# Patient Record
Sex: MALE | Race: WHITE | NOT HISPANIC OR LATINO | Employment: OTHER | URBAN - METROPOLITAN AREA
[De-identification: names, ages, dates, MRNs, and addresses within clinical notes are randomized per-mention and may not be internally consistent; named-entity substitution may affect disease eponyms.]

---

## 2017-04-21 ENCOUNTER — TELEPHONE (OUTPATIENT)
Dept: HEMATOLOGY/ONCOLOGY | Facility: CLINIC | Age: 32
End: 2017-04-21

## 2017-04-21 NOTE — TELEPHONE ENCOUNTER
----- Message from Luisa Casas sent at 4/21/2017  1:25 PM CDT -----  Contact: Annie Spicer (girlfriend)  Returned call in regards to possibly scheduling apt for her boyfriend. She states that her boyfriend is incarcerated and is not sure how he would be able to come here. She states that her boyfriend is having a lot of different complications and is needing us to contact the retirement or someone in regards to getting him here to be seen. She states that he has been very weak, spitting up blood, he has passed out and hit his head as well. She asked that Dr. Gregorio call her to see what she should do    Contact number  [phone]    ----- Message -----     From: Deidre Douglas RN     Sent: 4/21/2017   7:59 AM       To: Vannessa Goodrich        ----- Message -----     From: Kandy Haskins     Sent: 4/17/2017   1:05 PM       To: Jg Sultana    Annie states the pt is currently incarcerated and believes his cancer is reoccurring he hasn't been feeling well, she would like to schedule an appt on his behalf and the retirement will release him to make this appt    Contact number  [phone]  Thanks

## 2017-04-21 NOTE — TELEPHONE ENCOUNTER
Returned call to Annie Spicer. Instructed her to get in touch with the authorities at the correction to request that he be seen by a doctor and to give them our contact information in case they need medical records.

## 2017-07-31 ENCOUNTER — TELEPHONE (OUTPATIENT)
Dept: HEMATOLOGY/ONCOLOGY | Facility: CLINIC | Age: 32
End: 2017-07-31

## 2017-07-31 ENCOUNTER — NURSE TRIAGE (OUTPATIENT)
Dept: ADMINISTRATIVE | Facility: CLINIC | Age: 32
End: 2017-07-31

## 2017-07-31 NOTE — TELEPHONE ENCOUNTER
Returned phone call to patient's Annie landry. She states she's already made an appointment to see Dr. Gregorio-- appointment is 08/18/2017.

## 2017-07-31 NOTE — TELEPHONE ENCOUNTER
Caregiver calling to schedule appointment with MD due to pt coughing up blood.  Caregiver not with Pt.  Transferred call to MD office for further evaluation.

## 2017-07-31 NOTE — TELEPHONE ENCOUNTER
----- Message from Kathie James sent at 7/31/2017  2:43 PM CDT -----  Contact: Pt's alexandria Valencia  Good afternoon,     Pt's gris is requesting an appt due to coughing up blood, passing out, feeling weak, fatigued. (I got a nurse on call for the pt to speak to).    Annie can be reached at [phone].    Thank you!

## 2017-08-18 ENCOUNTER — LAB VISIT (OUTPATIENT)
Dept: LAB | Facility: HOSPITAL | Age: 32
End: 2017-08-18
Payer: MEDICARE

## 2017-08-18 ENCOUNTER — OFFICE VISIT (OUTPATIENT)
Dept: HEMATOLOGY/ONCOLOGY | Facility: CLINIC | Age: 32
End: 2017-08-18
Payer: MEDICARE

## 2017-08-18 VITALS
SYSTOLIC BLOOD PRESSURE: 121 MMHG | WEIGHT: 158.75 LBS | DIASTOLIC BLOOD PRESSURE: 75 MMHG | TEMPERATURE: 98 F | BODY MASS INDEX: 24.92 KG/M2 | HEART RATE: 75 BPM | HEIGHT: 67 IN

## 2017-08-18 DIAGNOSIS — C85.80 LARGE CELL LYMPHOMA: Primary | ICD-10-CM

## 2017-08-18 DIAGNOSIS — C85.80 LARGE CELL LYMPHOMA: ICD-10-CM

## 2017-08-18 LAB
ALBUMIN SERPL BCP-MCNC: 3.7 G/DL
ALP SERPL-CCNC: 93 U/L
ALT SERPL W/O P-5'-P-CCNC: 44 U/L
ANION GAP SERPL CALC-SCNC: 6 MMOL/L
AST SERPL-CCNC: 25 U/L
BASOPHILS # BLD AUTO: 0.01 K/UL
BASOPHILS NFR BLD: 0.1 %
BILIRUB SERPL-MCNC: 0.4 MG/DL
BUN SERPL-MCNC: 15 MG/DL
CALCIUM SERPL-MCNC: 9 MG/DL
CHLORIDE SERPL-SCNC: 106 MMOL/L
CO2 SERPL-SCNC: 29 MMOL/L
CREAT SERPL-MCNC: 0.8 MG/DL
DIFFERENTIAL METHOD: ABNORMAL
EOSINOPHIL # BLD AUTO: 0.1 K/UL
EOSINOPHIL NFR BLD: 1.5 %
ERYTHROCYTE [DISTWIDTH] IN BLOOD BY AUTOMATED COUNT: 13.4 %
EST. GFR  (AFRICAN AMERICAN): >60 ML/MIN/1.73 M^2
EST. GFR  (NON AFRICAN AMERICAN): >60 ML/MIN/1.73 M^2
GLUCOSE SERPL-MCNC: 73 MG/DL
HCT VFR BLD AUTO: 39.2 %
HGB BLD-MCNC: 14 G/DL
LDH SERPL L TO P-CCNC: 154 U/L
LYMPHOCYTES # BLD AUTO: 1.6 K/UL
LYMPHOCYTES NFR BLD: 23.8 %
MCH RBC QN AUTO: 33 PG
MCHC RBC AUTO-ENTMCNC: 35.7 G/DL
MCV RBC AUTO: 93 FL
MONOCYTES # BLD AUTO: 0.7 K/UL
MONOCYTES NFR BLD: 10.3 %
NEUTROPHILS # BLD AUTO: 4.3 K/UL
NEUTROPHILS NFR BLD: 64 %
PLATELET # BLD AUTO: 165 K/UL
PMV BLD AUTO: 10.2 FL
POTASSIUM SERPL-SCNC: 4.3 MMOL/L
PROT SERPL-MCNC: 6.6 G/DL
RBC # BLD AUTO: 4.24 M/UL
SODIUM SERPL-SCNC: 141 MMOL/L
WBC # BLD AUTO: 6.71 K/UL

## 2017-08-18 PROCEDURE — 3008F BODY MASS INDEX DOCD: CPT | Mod: ,,, | Performed by: INTERNAL MEDICINE

## 2017-08-18 PROCEDURE — 36415 COLL VENOUS BLD VENIPUNCTURE: CPT

## 2017-08-18 PROCEDURE — 99213 OFFICE O/P EST LOW 20 MIN: CPT | Mod: S$PBB,,, | Performed by: INTERNAL MEDICINE

## 2017-08-18 PROCEDURE — 80053 COMPREHEN METABOLIC PANEL: CPT

## 2017-08-18 PROCEDURE — 85025 COMPLETE CBC W/AUTO DIFF WBC: CPT

## 2017-08-18 PROCEDURE — 99999 PR PBB SHADOW E&M-EST. PATIENT-LVL III: CPT | Mod: PBBFAC,,, | Performed by: INTERNAL MEDICINE

## 2017-08-18 PROCEDURE — 83615 LACTATE (LD) (LDH) ENZYME: CPT

## 2017-08-18 NOTE — PROGRESS NOTES
Subjective:       Patient ID: Tom Johnston is a 32 y.o. male.    Chief Complaint: No chief complaint on file.    HPI  KPS: 90% Mr. 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 Follow up CT scans of the chest, abdomen and pelvis done 5/2/14 prior to transplant showed no disease in the chest but some small residual lesions were seen in the liver (none greater than 7 mm) which had all shrunk from before. There were apparently small splenic lesions as well but no mention of size on that study. PET scan could not be obtained related to insurance prohibition. Gallium scan done 6/6/14 and 12/4/14 were negative. Hence, he appeared to be in a CR prior to transplant which continued at day 114. He has stopped chewing tobacco but continues to smoke pot occasionally and cigarrettes. He is off work now and drinks beer.     No pain, fever, weight loss or abdominal pain.    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 It was treated with surgery followed by adriamycin/cyclophosphamide at [facility] in [place]. In July 2012 he presented with vomiting and back pain. Its work-up included a CT scan that revealed multiple liver and spleen lesions as well as retrocrural, gelacio hepatis, gastrohepatic and peripancreatic lymphadenopathy. He was also found to have right axillary adenopathy which was biopsied on 7/12/12 demonstrating diffuse large B cell lymphoma. (Specimen# [id number]; [facility]) Mr. Johnston reports that there were a controversy of whether his diagnosis being Hodgkins' lymphoma vs. Non-Hodgkins before the final announcement of diffuse large B cell lymphoma. Nor the pathology sample is available for review nor cytogenetics or molecular studies. LDH was 1055 and CMV was found  positive at that time. HIV was negative.     Subsequently he was started on R-EPOCH without adriamycin under the direction of Dr. Ambriz finishing 8th cycle on 3/1/13. After this, he underwent a CT scan which demonstrated mareked improvement of all the lesions except for a splenic mass of 3.3cm. This was surveillanced with a repeat CT scan in 11/2013 demonstrating enlargement of splenic masses and interval development of 30 hypodense areas in the liver. He was given 1 cycle of ritixumab/cyclophosphamide/oncovin/doxil/prednisone on 11/11/13 and referred to [facility]. Bone marrow biopsy did not reveal any sign of lymphoma. Liver mass was attempted to be IR-guided FNA biopsied, but it was inconclusive. He was admitted to the inpatient service and started first cycle of salvage RICE on 12/16/13. He finished three more cycles of RICE 1/15/14, 2/27/14 and 4/7/14.     Review of Systems   Constitutional: Negative for activity change, appetite change, diaphoresis, fatigue, fever and unexpected weight change.   HENT: Negative for congestion and sore throat.         Sore throat over the weekend   Eyes: Positive for visual disturbance (blurriness).   Respiratory: Negative for cough.    Cardiovascular: Negative for chest pain.   Gastrointestinal: Negative for abdominal distention, nausea and vomiting.   Genitourinary: Negative for frequency and hematuria.   Musculoskeletal: Positive for back pain (Chronic back pain.).   Skin: Negative for rash.   Neurological: Negative for numbness.   Hematological: Negative for adenopathy.   Psychiatric/Behavioral: Negative for confusion.       Objective:      Physical Exam   Constitutional: He is oriented to person, place, and time. He appears well-developed and well-nourished. No distress.   HENT:   Head: Normocephalic and atraumatic.   Mouth/Throat: Oropharynx is clear and moist. No oropharyngeal exudate.   Eyes: Conjunctivae are normal. Pupils are equal, round, and reactive to light.   Neck:  Neck supple.   Cardiovascular: Normal rate and regular rhythm.    Pulmonary/Chest: Effort normal and breath sounds normal. He has no rales.   Abdominal: Soft. Bowel sounds are normal. He exhibits no mass. There is no splenomegaly. There is no tenderness.   Musculoskeletal: Normal range of motion. He exhibits no edema.   Lymphadenopathy:     He has no cervical adenopathy.     He has no axillary adenopathy.        Right: No inguinal adenopathy present.        Left: No inguinal adenopathy present.   Neurological: He is alert and oriented to person, place, and time.   Skin: Skin is warm and dry. No rash noted.   Psychiatric: He has a normal mood and affect. Thought content normal.       Assessment:       1. Large cell lymphoma        Plan:       Mr. Johnston is doing very well at 3 years post PBSCT. His blood counts, chemistries and LDH are all normal today. He continues to smoke and I discussed this with him in some detail today.  He is off work now but feeling well.  He continue on parole for previous run in with the law.    There is no indication of lymphoma by symptoms, exam or labs today and he remains very active with good oral intake with improved activity level. Though we are not sure his original lymphoma was gallium avid, he appeared to be in a CR based on his gallium scan 12/2014. Bone marrow done 6/11/14 showed 50% cellularity with trilineage hematopoiesis and no evidence of lymphoma. Cytogenetics 46,XY[20].  He missed many appointments for restaging over the past 2 years and given his normal blood counts I don't feel he needs another bone marrow at this time.    All of his questions were answered in the clinic today. I will see him back in 6 months.

## 2017-08-21 ENCOUNTER — TELEPHONE (OUTPATIENT)
Dept: HEMATOLOGY/ONCOLOGY | Facility: CLINIC | Age: 32
End: 2017-08-21

## 2017-08-21 NOTE — TELEPHONE ENCOUNTER
----- Message from Phillip Stoner sent at 8/21/2017 11:00 AM CDT -----  Contact: PT   PT is giving an update on phone number    Contact: [phone]

## 2018-04-19 DIAGNOSIS — Z51.11 ENCOUNTER FOR ANTINEOPLASTIC CHEMOTHERAPY: Primary | ICD-10-CM

## 2018-05-01 ENCOUNTER — TELEPHONE (OUTPATIENT)
Dept: HEMATOLOGY/ONCOLOGY | Facility: CLINIC | Age: 33
End: 2018-05-01

## 2018-05-01 NOTE — TELEPHONE ENCOUNTER
----- Message from Fabiana Daley sent at 5/1/2018 10:04 AM CDT -----  Contact: Pt  Pt called Patient Requesting Sooner Appointment.   Has a appt today and wants to come in early   Callback#[phone]

## 2018-05-02 ENCOUNTER — LAB VISIT (OUTPATIENT)
Dept: LAB | Facility: HOSPITAL | Age: 33
End: 2018-05-02
Payer: MEDICARE

## 2018-05-02 ENCOUNTER — OFFICE VISIT (OUTPATIENT)
Dept: HEMATOLOGY/ONCOLOGY | Facility: CLINIC | Age: 33
End: 2018-05-02
Payer: MEDICARE

## 2018-05-02 VITALS
TEMPERATURE: 98 F | HEART RATE: 84 BPM | WEIGHT: 158.75 LBS | DIASTOLIC BLOOD PRESSURE: 66 MMHG | RESPIRATION RATE: 18 BRPM | HEIGHT: 67 IN | SYSTOLIC BLOOD PRESSURE: 121 MMHG | BODY MASS INDEX: 24.92 KG/M2 | OXYGEN SATURATION: 95 %

## 2018-05-02 DIAGNOSIS — Z51.11 ENCOUNTER FOR ANTINEOPLASTIC CHEMOTHERAPY: ICD-10-CM

## 2018-05-02 DIAGNOSIS — C85.80 LARGE CELL LYMPHOMA: Primary | ICD-10-CM

## 2018-05-02 DIAGNOSIS — F32.A DEPRESSION, UNSPECIFIED DEPRESSION TYPE: ICD-10-CM

## 2018-05-02 DIAGNOSIS — Z94.84 HISTORY OF PERIPHERAL STEM CELL TRANSPLANT: ICD-10-CM

## 2018-05-02 LAB
ALBUMIN SERPL BCP-MCNC: 3.7 G/DL
ALP SERPL-CCNC: 86 U/L
ALT SERPL W/O P-5'-P-CCNC: 15 U/L
ANION GAP SERPL CALC-SCNC: 6 MMOL/L
AST SERPL-CCNC: 13 U/L
BASOPHILS # BLD AUTO: 0.03 K/UL
BASOPHILS NFR BLD: 0.4 %
BILIRUB SERPL-MCNC: 0.3 MG/DL
BUN SERPL-MCNC: 15 MG/DL
CALCIUM SERPL-MCNC: 9.4 MG/DL
CHLORIDE SERPL-SCNC: 109 MMOL/L
CO2 SERPL-SCNC: 27 MMOL/L
CREAT SERPL-MCNC: 0.8 MG/DL
DIFFERENTIAL METHOD: ABNORMAL
EOSINOPHIL # BLD AUTO: 0.1 K/UL
EOSINOPHIL NFR BLD: 1.1 %
ERYTHROCYTE [DISTWIDTH] IN BLOOD BY AUTOMATED COUNT: 13.2 %
EST. GFR  (AFRICAN AMERICAN): >60 ML/MIN/1.73 M^2
EST. GFR  (NON AFRICAN AMERICAN): >60 ML/MIN/1.73 M^2
GLUCOSE SERPL-MCNC: 88 MG/DL
HCT VFR BLD AUTO: 37.2 %
HGB BLD-MCNC: 13.1 G/DL
IMM GRANULOCYTES # BLD AUTO: 0.03 K/UL
IMM GRANULOCYTES NFR BLD AUTO: 0.4 %
LDH SERPL L TO P-CCNC: 132 U/L
LYMPHOCYTES # BLD AUTO: 1.8 K/UL
LYMPHOCYTES NFR BLD: 21.8 %
MCH RBC QN AUTO: 32.4 PG
MCHC RBC AUTO-ENTMCNC: 35.2 G/DL
MCV RBC AUTO: 92 FL
MONOCYTES # BLD AUTO: 0.7 K/UL
MONOCYTES NFR BLD: 8.3 %
NEUTROPHILS # BLD AUTO: 5.6 K/UL
NEUTROPHILS NFR BLD: 68 %
NRBC BLD-RTO: 0 /100 WBC
PLATELET # BLD AUTO: 184 K/UL
PMV BLD AUTO: 10.3 FL
POTASSIUM SERPL-SCNC: 4.2 MMOL/L
PROT SERPL-MCNC: 6.5 G/DL
RBC # BLD AUTO: 4.04 M/UL
SODIUM SERPL-SCNC: 142 MMOL/L
WBC # BLD AUTO: 8.18 K/UL

## 2018-05-02 PROCEDURE — 80053 COMPREHEN METABOLIC PANEL: CPT

## 2018-05-02 PROCEDURE — 85025 COMPLETE CBC W/AUTO DIFF WBC: CPT

## 2018-05-02 PROCEDURE — 83615 LACTATE (LD) (LDH) ENZYME: CPT

## 2018-05-02 PROCEDURE — 99999 PR PBB SHADOW E&M-EST. PATIENT-LVL III: CPT | Mod: PBBFAC,,, | Performed by: INTERNAL MEDICINE

## 2018-05-02 PROCEDURE — 99213 OFFICE O/P EST LOW 20 MIN: CPT | Mod: PBBFAC | Performed by: INTERNAL MEDICINE

## 2018-05-02 PROCEDURE — 36415 COLL VENOUS BLD VENIPUNCTURE: CPT

## 2018-05-02 PROCEDURE — 99215 OFFICE O/P EST HI 40 MIN: CPT | Mod: S$PBB,,, | Performed by: INTERNAL MEDICINE

## 2018-05-02 NOTE — PROGRESS NOTES
Hematology and Medical Oncology   Follow Up Note     05/02/2018    Primary Oncologic Diagnosis:DLBCL s/p ASCT in 2014    History of Present Ilness:   Tom Johnston (Tom) is a pleasant 33 y.o.male who presents after being lost to follow up for almost a year.    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 It was treated with surgery followed by adriamycin/cyclophosphamide at [facility] in [place].   --In July 2012 he presented with vomiting and back pain. Its work-up included a CT scan that revealed multiple liver and spleen lesions as well as retrocrural, gelacio hepatis, gastrohepatic and peripancreatic lymphadenopathy. He was also found to have right axillary adenopathy which was biopsied on 7/12/12 demonstrating diffuse large B cell lymphoma. (Specimen# [id number]; [facility])   --Mr. Johnston reports that there were a controversy of whether his diagnosis being Hodgkins' lymphoma vs. Non-Hodgkins before the final announcement of diffuse large B cell lymphoma. Nor the pathology sample is available for review nor cytogenetics or molecular studies. LDH was 1055 and CMV was found positive at that time. HIV was negative.    --Subsequently he was started on R-EPOCH without adriamycin under the direction of Dr. Ambriz finishing 8th cycle on 3/1/13. After this, he underwent a CT scan which demonstrated mareked improvement of all the lesions except for a splenic mass of 3.3cm. This was surveillanced with a repeat CT scan in 11/2013 demonstrating enlargement of splenic masses and interval development of 30 hypodense areas in the liver. He was given 1 cycle of ritixumab/cyclophosphamide/oncovin/doxil/prednisone on 11/11/13 and referred to [facility]. Bone marrow biopsy did not reveal any sign of lymphoma. Liver mass was attempted to be IR-guided FNA biopsied, but it was inconclusive. He was admitted to the inpatient service and started first cycle of salvage  RICE on 12/16/13. He finished three more cycles of RICE 1/15/14, 2/27/14 and 4/7/14.   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 There were apparently small splenic lesions as well but no mention of size on that study. PET scan could not be obtained related to insurance prohibition.   --Gallium scan done 6/6/14 and 12/4/14 were negative. Hence, he appeared to be in a CR prior to transplant which continued at day 114.   --Bone marrow done 6/11/14 showed 50% cellularity with trilineage hematopoiesis and no evidence of lymphoma. Cytogenetics 46,XY    Interval History:   Mr. Johnston is doing well overall. 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 He has noticed a slight increase in fatigue whenever he stops moving. He has tried to stop smoking by replacing it with chewing tobacco.     Past Medical History:   Past Medical History:   Diagnosis Date    Bone marrow transplant status     Cancer     Chronic back pain     Reflux        Current Medications:   Current Outpatient Prescriptions   Medication Sig    ranitidine (ZANTAC) 150 MG tablet Take 1 tablet (150 mg total) by mouth nightly.     No current facility-administered medications for this visit.      ALLERGIES:   Review of patient's allergies indicates:  No Known Allergies    Review of Systems:     Review of Systems   Constitutional: Positive for fatigue. Negative for appetite change, chills, diaphoresis, fever and unexpected weight change.   HENT:   Negative for hearing loss, mouth sores, nosebleeds, sore throat, trouble swallowing and voice change.    Eyes: Negative for eye problems and icterus.   Respiratory: Negative for chest tightness, cough, hemoptysis, shortness of breath and wheezing.     Cardiovascular: Negative for chest pain, leg swelling and palpitations.   Gastrointestinal: Negative for abdominal distention, abdominal pain, blood in stool, diarrhea, nausea and vomiting.   Endocrine: Negative for hot flashes.   Genitourinary: Negative for bladder incontinence, difficulty urinating, dysuria and hematuria.    Musculoskeletal: Negative for arthralgias, back pain, flank pain, gait problem, myalgias, neck pain and neck stiffness.   Skin: Negative for itching, rash and wound.   Neurological: Negative for dizziness, extremity weakness, gait problem, headaches, numbness, seizures and speech difficulty.   Hematological: Negative for adenopathy. Does not bruise/bleed easily.   Psychiatric/Behavioral: Negative for confusion, depression and sleep disturbance. The patient is not nervous/anxious.           Physical Exam:     Vitals:    05/02/18 0818   BP: 121/66   Pulse: 84   Resp: 18   Temp: 97.8 °F (36.6 °C)       Physical Exam   Constitutional: He is oriented to person, place, and time. He appears well-developed and well-nourished. No distress.   HENT:   Head: Normocephalic and atraumatic.   Mouth/Throat: Oropharynx is clear and moist. No oropharyngeal exudate.   Eyes: Conjunctivae and EOM are normal. Pupils are equal, round, and reactive to light.   Neck: Normal range of motion. Neck supple. No JVD present. No tracheal deviation present. No thyromegaly present.   Cardiovascular: Normal rate, regular rhythm and normal heart sounds.  Exam reveals no friction rub.    No murmur heard.  Pulmonary/Chest: Effort normal and breath sounds normal. No stridor. No respiratory distress. He has no wheezes. He has no rales. He exhibits no tenderness.   Abdominal: Soft. Bowel sounds are normal. He exhibits no distension. There is no tenderness. There is no rebound and no guarding.   Musculoskeletal: Normal range of motion. He exhibits no edema, tenderness or deformity.   Lymphadenopathy:     He has no axillary  adenopathy.   Neurological: He is alert and oriented to person, place, and time. He displays normal reflexes. No cranial nerve deficit or sensory deficit. He exhibits normal muscle tone. Coordination normal.   Skin: Skin is warm and dry. Capillary refill takes less than 2 seconds. No rash noted. He is not diaphoretic. No erythema. No pallor.   Numerous forarm tatoos   Psychiatric: He has a normal mood and affect. His behavior is normal. Judgment and thought content normal.       ECOG Performance Status: (foot note - ECOG PS provided by Eastern Cooperative Oncology Group) 0 - Asymptomatic    Karnofsky Performance Score:  100%- Normal, No Complaints, No Evidence of Disease    Labs:   Lab Results   Component Value Date    WBC 8.18 05/02/2018    HGB 13.1 (L) 05/02/2018    HCT 37.2 (L) 05/02/2018     05/02/2018    ALT 15 05/02/2018    AST 13 05/02/2018     05/02/2018    K 4.2 05/02/2018     05/02/2018    CREATININE 0.8 05/02/2018    BUN 15 05/02/2018    CO2 27 05/02/2018    INR 1.0 08/05/2014     LDH:132    Imaging: Previous imaging has been personally reviewed     Assessment and Plan:     Mr. Johnston is a 33 year old male who present post ASCT for lymphoma    DLBCL  --Doing well 4 years post ASCT  --All labs are within normal limits   --Denies all B symptoms  --Continue to follow with twice yearly physical exams    Tobacco Use  --Has stopped smoking cigarettes, but now utilizes chewing tobacco  --The importance of all tobacco cessation was discussed   --Medication and counseling were both offered, he is not willing to discuss either    He missed many appointments for restaging over the past 2 years and given his normal blood counts I don't feel he needs another bone marrow at this time.    30 minutes were spent face to face with the patient to discuss the disease, natural history, treatment options and survival statistics. I have provided the patient with an opportunity to ask questions and have all  questions answered to his satisfaction.       he will return to clinic in 6 months, but knows to call in the interim if symptoms change or should a problem arise.      Homa Gomez MD  Hematology and Medical Oncology  Bone Marrow Transplant  [facility]

## 2018-08-01 ENCOUNTER — TELEPHONE (OUTPATIENT)
Dept: HEMATOLOGY/ONCOLOGY | Facility: CLINIC | Age: 33
End: 2018-08-01

## 2018-08-01 NOTE — TELEPHONE ENCOUNTER
Called pt to remind him about his lab and 6 month follow up appt with Dr. Gomez. Unable to reach pt and unable to leave a voicemail. Will reschedule pt if pt is a no show this morning.

## 2018-10-10 ENCOUNTER — LAB VISIT (OUTPATIENT)
Dept: LAB | Facility: HOSPITAL | Age: 33
End: 2018-10-10
Payer: MEDICARE

## 2018-10-10 DIAGNOSIS — C83.30: ICD-10-CM

## 2018-10-10 DIAGNOSIS — C83.30: Primary | ICD-10-CM

## 2018-10-10 LAB
ALBUMIN SERPL BCP-MCNC: 4.2 G/DL
ALP SERPL-CCNC: 89 U/L
ALT SERPL W/O P-5'-P-CCNC: 16 U/L
ANION GAP SERPL CALC-SCNC: 6 MMOL/L
AST SERPL-CCNC: 17 U/L
BILIRUB SERPL-MCNC: 0.7 MG/DL
BUN SERPL-MCNC: 12 MG/DL
CALCIUM SERPL-MCNC: 9.6 MG/DL
CHLORIDE SERPL-SCNC: 104 MMOL/L
CO2 SERPL-SCNC: 29 MMOL/L
CREAT SERPL-MCNC: 0.8 MG/DL
ERYTHROCYTE [DISTWIDTH] IN BLOOD BY AUTOMATED COUNT: 12.7 %
EST. GFR  (AFRICAN AMERICAN): >60 ML/MIN/1.73 M^2
EST. GFR  (NON AFRICAN AMERICAN): >60 ML/MIN/1.73 M^2
GLUCOSE SERPL-MCNC: 92 MG/DL
HCT VFR BLD AUTO: 41 %
HGB BLD-MCNC: 14.3 G/DL
IMM GRANULOCYTES # BLD AUTO: 0.03 K/UL
LDH SERPL L TO P-CCNC: 154 U/L
MCH RBC QN AUTO: 32.8 PG
MCHC RBC AUTO-ENTMCNC: 34.9 G/DL
MCV RBC AUTO: 94 FL
NEUTROPHILS # BLD AUTO: 5.7 K/UL
PLATELET # BLD AUTO: 204 K/UL
PMV BLD AUTO: 10.1 FL
POTASSIUM SERPL-SCNC: 4.4 MMOL/L
PROT SERPL-MCNC: 6.8 G/DL
RBC # BLD AUTO: 4.36 M/UL
SODIUM SERPL-SCNC: 139 MMOL/L
WBC # BLD AUTO: 8.23 K/UL

## 2018-10-10 PROCEDURE — 80053 COMPREHEN METABOLIC PANEL: CPT

## 2018-10-10 PROCEDURE — 85027 COMPLETE CBC AUTOMATED: CPT

## 2018-10-10 PROCEDURE — 83615 LACTATE (LD) (LDH) ENZYME: CPT

## 2018-10-10 PROCEDURE — 36415 COLL VENOUS BLD VENIPUNCTURE: CPT

## 2018-10-11 ENCOUNTER — OFFICE VISIT (OUTPATIENT)
Dept: HEMATOLOGY/ONCOLOGY | Facility: CLINIC | Age: 33
End: 2018-10-11
Payer: MEDICARE

## 2018-10-11 VITALS
HEART RATE: 82 BPM | RESPIRATION RATE: 18 BRPM | SYSTOLIC BLOOD PRESSURE: 118 MMHG | TEMPERATURE: 98 F | DIASTOLIC BLOOD PRESSURE: 71 MMHG | WEIGHT: 158.31 LBS | OXYGEN SATURATION: 97 % | BODY MASS INDEX: 24.79 KG/M2

## 2018-10-11 DIAGNOSIS — N52.1 DIABETES MELLITUS WITH NEUROPATHY CAUSING ERECTILE DYSFUNCTION: ICD-10-CM

## 2018-10-11 DIAGNOSIS — Z94.84 HISTORY OF PERIPHERAL STEM CELL TRANSPLANT: Primary | ICD-10-CM

## 2018-10-11 DIAGNOSIS — E11.40 DIABETES MELLITUS WITH NEUROPATHY CAUSING ERECTILE DYSFUNCTION: ICD-10-CM

## 2018-10-11 DIAGNOSIS — C83.38 DIFFUSE LARGE B-CELL LYMPHOMA OF LYMPH NODES OF MULTIPLE REGIONS: ICD-10-CM

## 2018-10-11 PROCEDURE — 99215 OFFICE O/P EST HI 40 MIN: CPT | Mod: S$PBB,,, | Performed by: INTERNAL MEDICINE

## 2018-10-11 PROCEDURE — 99213 OFFICE O/P EST LOW 20 MIN: CPT | Mod: PBBFAC | Performed by: INTERNAL MEDICINE

## 2018-10-11 PROCEDURE — 99999 PR PBB SHADOW E&M-EST. PATIENT-LVL III: CPT | Mod: PBBFAC,,, | Performed by: INTERNAL MEDICINE

## 2018-10-11 NOTE — PROGRESS NOTES
Hematology and Medical Oncology   Follow Up Note     10/11/2018    Primary Oncologic Diagnosis:DLBCL s/p ASCT in 2014    History of Present Ilness:   Tom Johnston (Tom) is a pleasant 33 y.o.male who presents after being lost to follow up for almost a year.    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 It was treated with surgery followed by adriamycin/cyclophosphamide at [facility] in [place].   --In July 2012 he presented with vomiting and back pain. Its work-up included a CT scan that revealed multiple liver and spleen lesions as well as retrocrural, gelacio hepatis, gastrohepatic and peripancreatic lymphadenopathy. He was also found to have right axillary adenopathy which was biopsied on 7/12/12 demonstrating diffuse large B cell lymphoma. (Specimen# [id number]; [facility])   --Mr. Johnston reports that there were a controversy of whether his diagnosis being Hodgkins' lymphoma vs. Non-Hodgkins before the final announcement of diffuse large B cell lymphoma. Nor the pathology sample is available for review nor cytogenetics or molecular studies. LDH was 1055 and CMV was found positive at that time. HIV was negative.    --Subsequently he was started on R-EPOCH without adriamycin under the direction of Dr. Ambriz finishing 8th cycle on 3/1/13. After this, he underwent a CT scan which demonstrated mareked improvement of all the lesions except for a splenic mass of 3.3cm. This was surveillanced with a repeat CT scan in 11/2013 demonstrating enlargement of splenic masses and interval development of 30 hypodense areas in the liver. He was given 1 cycle of ritixumab/cyclophosphamide/oncovin/doxil/prednisone on 11/11/13 and referred to [facility]. Bone marrow biopsy did not reveal any sign of lymphoma. Liver mass was attempted to be IR-guided FNA biopsied, but it was inconclusive. He was admitted to the inpatient service and started first cycle of salvage  RICE on 12/16/13. He finished three more cycles of RICE 1/15/14, 2/27/14 and 4/7/14.   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 There were apparently small splenic lesions as well but no mention of size on that study. PET scan could not be obtained related to insurance prohibition.   --Gallium scan done 6/6/14 and 12/4/14 were negative. Hence, he appeared to be in a CR prior to transplant which continued at day 114.   --Bone marrow done 6/11/14 showed 50% cellularity with trilineage hematopoiesis and no evidence of lymphoma. Cytogenetics 46,XY    Interval History:   Mr. Johnston is doing well. He still works in a boiler room. Recently he has found himself having blurry vision, but never seen by a doctor. His other pressing issue is long standing lack of sexual desire and inconsistent erection. He continues to smoke 2-3 cigarettes/day and has not made an attempt to quit therapy.     Past Medical History:   Past Medical History:   Diagnosis Date    Bone marrow transplant status     Cancer     Chronic back pain     Reflux        Current Medications:   No current outpatient medications on file.     No current facility-administered medications for this visit.      ALLERGIES:   Review of patient's allergies indicates:  No Known Allergies    Review of Systems:     Review of Systems   Constitutional: Positive for fatigue. Negative for appetite change, chills, diaphoresis, fever and unexpected weight change.   HENT:   Negative for hearing loss, mouth sores, nosebleeds, sore throat, trouble swallowing and voice change.    Eyes: Negative for eye problems and icterus.   Respiratory: Negative for chest tightness, cough, hemoptysis, shortness of breath and wheezing.     Cardiovascular: Negative for chest pain, leg swelling and palpitations.   Gastrointestinal: Negative for abdominal distention, abdominal pain, blood in stool, diarrhea, nausea and vomiting.   Endocrine: Negative for hot flashes.   Genitourinary: Negative for bladder incontinence, difficulty urinating, dysuria and hematuria.    Musculoskeletal: Negative for arthralgias, back pain, flank pain, gait problem, myalgias, neck pain and neck stiffness.   Skin: Negative for itching, rash and wound.   Neurological: Negative for dizziness, extremity weakness, gait problem, headaches, numbness, seizures and speech difficulty.   Hematological: Negative for adenopathy. Does not bruise/bleed easily.   Psychiatric/Behavioral: Negative for confusion, depression and sleep disturbance. The patient is not nervous/anxious.         Physical Exam:     Vitals:    10/11/18 1511   BP: 118/71   Pulse: 82   Resp: 18   Temp: 98.1 °F (36.7 °C)       Physical Exam   Constitutional: He is oriented to person, place, and time. He appears well-developed and well-nourished. No distress.   HENT:   Head: Normocephalic and atraumatic.   Mouth/Throat: Oropharynx is clear and moist. No oropharyngeal exudate.   Eyes: Conjunctivae and EOM are normal. Pupils are equal, round, and reactive to light.   Neck: Normal range of motion. Neck supple. No JVD present. No tracheal deviation present. No thyromegaly present.   Cardiovascular: Normal rate, regular rhythm and normal heart sounds. Exam reveals no friction rub.   No murmur heard.  Pulmonary/Chest: Effort normal and breath sounds normal. No stridor. No respiratory distress. He has no wheezes. He has no rales. He exhibits no tenderness.   Abdominal: Soft. Bowel sounds are normal. He exhibits no distension. There is no tenderness. There is no rebound and no guarding.   Musculoskeletal: Normal range of motion. He exhibits no edema, tenderness or deformity.   Lymphadenopathy:     He has no axillary adenopathy.    Neurological: He is alert and oriented to person, place, and time. He displays normal reflexes. No cranial nerve deficit or sensory deficit. He exhibits normal muscle tone. Coordination normal.   Skin: Skin is warm and dry. Capillary refill takes less than 2 seconds. No rash noted. He is not diaphoretic. No erythema. No pallor.   Numerous forarm tatoos   Psychiatric: He has a normal mood and affect. His behavior is normal. Judgment and thought content normal.       ECOG Performance Status: (foot note - ECOG PS provided by Eastern Cooperative Oncology Group) 0 - Asymptomatic    Karnofsky Performance Score:  100%- Normal, No Complaints, No Evidence of Disease    Labs:   Lab Results   Component Value Date    WBC 8.23 10/10/2018    HGB 14.3 10/10/2018    HCT 41.0 10/10/2018     10/10/2018    ALT 16 10/10/2018    AST 17 10/10/2018     10/10/2018    K 4.4 10/10/2018     10/10/2018    CREATININE 0.8 10/10/2018    BUN 12 10/10/2018    CO2 29 10/10/2018    INR 1.0 08/05/2014     LDH:132    Imaging: Previous imaging has been personally reviewed     Assessment and Plan:     Mr. Johnston is a 33 year old male who present post ASCT for lymphoma    DLBCL  --Doing well 4 years post ASCT  --All labs are within normal limits   --Denies all B symptoms  --Continue to follow with twice yearly physical exams    Tobacco Use  --He has returned to smoking cigarettes  --The importance of all tobacco cessation was discussed   --Medication and counseling were both offered, he is not willing to discuss either    Erectile Dysfunction  --Possibly related to extensive previous chemotherapy  --Will refer to urology    He missed many appointments for restaging over the past 2 years and given his normal blood counts I don't feel he needs another bone marrow at this time.    30 minutes were spent face to face with the patient to discuss the disease, natural history, treatment options and survival statistics. I have provided the  patient with an opportunity to ask questions and have all questions answered to his satisfaction.       he will return to clinic in 6 months, but knows to call in the interim if symptoms change or should a problem arise.      Homa Gomez MD  Hematology and Medical Oncology  Bone Marrow Transplant  [facility]

## 2019-04-09 ENCOUNTER — TELEPHONE (OUTPATIENT)
Dept: HEMATOLOGY/ONCOLOGY | Facility: CLINIC | Age: 34
End: 2019-04-09

## 2019-04-09 NOTE — TELEPHONE ENCOUNTER
Returned call rescheduled for 4/29 lab and 4/30 with Dr Gomez.   Mailed new appt slip    ----- Message from Diana Valenzuela sent at 4/8/2019  3:42 PM CDT -----  Contact: Pt   Pt calling to reschedule his lab appt to later , around 2 pm  Please contact him at [phone]

## 2019-04-29 ENCOUNTER — LAB VISIT (OUTPATIENT)
Dept: LAB | Facility: HOSPITAL | Age: 34
End: 2019-04-29
Payer: MEDICARE

## 2019-04-29 DIAGNOSIS — C83.30: ICD-10-CM

## 2019-04-29 LAB
ALBUMIN SERPL BCP-MCNC: 4.1 G/DL (ref 3.5–5.2)
ALP SERPL-CCNC: 97 U/L (ref 55–135)
ALT SERPL W/O P-5'-P-CCNC: 16 U/L (ref 10–44)
ANION GAP SERPL CALC-SCNC: 9 MMOL/L (ref 8–16)
AST SERPL-CCNC: 18 U/L (ref 10–40)
BILIRUB SERPL-MCNC: 0.5 MG/DL (ref 0.1–1)
BUN SERPL-MCNC: 13 MG/DL (ref 6–20)
CALCIUM SERPL-MCNC: 9.8 MG/DL (ref 8.7–10.5)
CHLORIDE SERPL-SCNC: 106 MMOL/L (ref 95–110)
CO2 SERPL-SCNC: 27 MMOL/L (ref 23–29)
CREAT SERPL-MCNC: 0.8 MG/DL (ref 0.5–1.4)
ERYTHROCYTE [DISTWIDTH] IN BLOOD BY AUTOMATED COUNT: 12.7 % (ref 11.5–14.5)
EST. GFR  (AFRICAN AMERICAN): >60 ML/MIN/1.73 M^2
EST. GFR  (NON AFRICAN AMERICAN): >60 ML/MIN/1.73 M^2
GLUCOSE SERPL-MCNC: 95 MG/DL (ref 70–110)
HCT VFR BLD AUTO: 38.5 % (ref 40–54)
HGB BLD-MCNC: 13.6 G/DL (ref 14–18)
IMM GRANULOCYTES # BLD AUTO: 0.03 K/UL (ref 0–0.04)
LDH SERPL L TO P-CCNC: 150 U/L (ref 110–260)
MCH RBC QN AUTO: 32.8 PG (ref 27–31)
MCHC RBC AUTO-ENTMCNC: 35.3 G/DL (ref 32–36)
MCV RBC AUTO: 93 FL (ref 82–98)
NEUTROPHILS # BLD AUTO: 6.4 K/UL (ref 1.8–7.7)
PLATELET # BLD AUTO: 205 K/UL (ref 150–350)
PMV BLD AUTO: 10.6 FL (ref 9.2–12.9)
POTASSIUM SERPL-SCNC: 4.2 MMOL/L (ref 3.5–5.1)
PROT SERPL-MCNC: 6.8 G/DL (ref 6–8.4)
RBC # BLD AUTO: 4.15 M/UL (ref 4.6–6.2)
SODIUM SERPL-SCNC: 142 MMOL/L (ref 136–145)
WBC # BLD AUTO: 9.02 K/UL (ref 3.9–12.7)

## 2019-04-29 PROCEDURE — 83615 LACTATE (LD) (LDH) ENZYME: CPT

## 2019-04-29 PROCEDURE — 36415 COLL VENOUS BLD VENIPUNCTURE: CPT

## 2019-04-29 PROCEDURE — 80053 COMPREHEN METABOLIC PANEL: CPT

## 2019-04-29 PROCEDURE — 85027 COMPLETE CBC AUTOMATED: CPT

## 2019-04-30 ENCOUNTER — TELEPHONE (OUTPATIENT)
Dept: HEMATOLOGY/ONCOLOGY | Facility: CLINIC | Age: 34
End: 2019-04-30

## 2019-04-30 ENCOUNTER — OFFICE VISIT (OUTPATIENT)
Dept: HEMATOLOGY/ONCOLOGY | Facility: CLINIC | Age: 34
End: 2019-04-30
Payer: MEDICARE

## 2019-04-30 VITALS
SYSTOLIC BLOOD PRESSURE: 123 MMHG | HEART RATE: 79 BPM | WEIGHT: 168.88 LBS | DIASTOLIC BLOOD PRESSURE: 79 MMHG | HEIGHT: 66 IN | TEMPERATURE: 98 F | BODY MASS INDEX: 27.14 KG/M2

## 2019-04-30 DIAGNOSIS — C83.38 DIFFUSE LARGE B-CELL LYMPHOMA OF LYMPH NODES OF MULTIPLE REGIONS: ICD-10-CM

## 2019-04-30 DIAGNOSIS — Z00.00 HEALTHCARE MAINTENANCE: Primary | ICD-10-CM

## 2019-04-30 DIAGNOSIS — H53.8 BLURRY VISION: ICD-10-CM

## 2019-04-30 PROCEDURE — 99999 PR PBB SHADOW E&M-EST. PATIENT-LVL III: ICD-10-PCS | Mod: PBBFAC,,, | Performed by: INTERNAL MEDICINE

## 2019-04-30 PROCEDURE — 99213 OFFICE O/P EST LOW 20 MIN: CPT | Mod: PBBFAC | Performed by: INTERNAL MEDICINE

## 2019-04-30 PROCEDURE — 99215 PR OFFICE/OUTPT VISIT, EST, LEVL V, 40-54 MIN: ICD-10-PCS | Mod: S$PBB,,, | Performed by: INTERNAL MEDICINE

## 2019-04-30 PROCEDURE — 99215 OFFICE O/P EST HI 40 MIN: CPT | Mod: S$PBB,,, | Performed by: INTERNAL MEDICINE

## 2019-04-30 PROCEDURE — 99999 PR PBB SHADOW E&M-EST. PATIENT-LVL III: CPT | Mod: PBBFAC,,, | Performed by: INTERNAL MEDICINE

## 2019-04-30 NOTE — PROGRESS NOTES
Hematology and Medical Oncology   Follow Up Note     04/30/2019    Primary Oncologic Diagnosis:DLBCL s/p ASCT in 2014    History of Present Ilness:   Tom Johnston (Tom) is a pleasant 34 y.o.male who presents after being lost to follow up for almost a year.    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 It was treated with surgery followed by adriamycin/cyclophosphamide at [facility] in [place].   --In July 2012 he presented with vomiting and back pain. Its work-up included a CT scan that revealed multiple liver and spleen lesions as well as retrocrural, gelacio hepatis, gastrohepatic and peripancreatic lymphadenopathy. He was also found to have right axillary adenopathy which was biopsied on 7/12/12 demonstrating diffuse large B cell lymphoma. (Specimen# [id number]; [facility])   --Mr. Johnston reports that there were a controversy of whether his diagnosis being Hodgkins' lymphoma vs. Non-Hodgkins before the final announcement of diffuse large B cell lymphoma. Nor the pathology sample is available for review nor cytogenetics or molecular studies. LDH was 1055 and CMV was found positive at that time. HIV was negative.    --Subsequently he was started on R-EPOCH without adriamycin under the direction of Dr. Ambriz finishing 8th cycle on 3/1/13. After this, he underwent a CT scan which demonstrated mareked improvement of all the lesions except for a splenic mass of 3.3cm. This was surveillanced with a repeat CT scan in 11/2013 demonstrating enlargement of splenic masses and interval development of 30 hypodense areas in the liver. He was given 1 cycle of ritixumab/cyclophosphamide/oncovin/doxil/prednisone on 11/11/13 and referred to [facility]. Bone marrow biopsy did not reveal any sign of lymphoma. Liver mass was attempted to be IR-guided FNA biopsied, but it was inconclusive. He was admitted to the inpatient service and started first cycle of salvage  RICE on 12/16/13. He finished three more cycles of RICE 1/15/14, 2/27/14 and 4/7/14.   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 There were apparently small splenic lesions as well but no mention of size on that study. PET scan could not be obtained related to insurance prohibition.   --Gallium scan done 6/6/14 and 12/4/14 were negative. Hence, he appeared to be in a CR prior to transplant which continued at day 114.   --Bone marrow done 6/11/14 showed 50% cellularity with trilineage hematopoiesis and no evidence of lymphoma. Cytogenetics 46,XY    Interval History:   Mr. Johnston is doing well. He still works in a boiler room. The blurry vision discussed at the last visit is subjectively better, but he has never seen optho like I previously suggested. His only complaint is a circular crusty lesion on the axillary fold of the left arm. It has been slowly enlarging. It will occassionally dry out and crack, which gets stuck on his shirt causing it to bleed.    Past Medical History:   Past Medical History:   Diagnosis Date    Bone marrow transplant status     Cancer     Chronic back pain     Reflux        Current Medications:   No current outpatient medications on file.     No current facility-administered medications for this visit.      ALLERGIES:   Review of patient's allergies indicates:  No Known Allergies    Review of Systems:     Review of Systems   Constitutional: Positive for fatigue. Negative for appetite change, chills, diaphoresis, fever and unexpected weight change.   HENT:   Negative for hearing loss, mouth sores, nosebleeds, sore throat, trouble swallowing and voice change.    Eyes: Negative for eye problems and icterus.   Respiratory: Negative  for chest tightness, cough, hemoptysis, shortness of breath and wheezing.    Cardiovascular: Negative for chest pain, leg swelling and palpitations.   Gastrointestinal: Negative for abdominal distention, abdominal pain, blood in stool, diarrhea, nausea and vomiting.   Endocrine: Negative for hot flashes.   Genitourinary: Negative for bladder incontinence, difficulty urinating, dysuria and hematuria.    Musculoskeletal: Negative for arthralgias, back pain, flank pain, gait problem, myalgias, neck pain and neck stiffness.   Skin: Negative for itching, rash and wound.   Neurological: Negative for dizziness, extremity weakness, gait problem, headaches, numbness, seizures and speech difficulty.   Hematological: Negative for adenopathy. Does not bruise/bleed easily.   Psychiatric/Behavioral: Negative for confusion, depression and sleep disturbance. The patient is not nervous/anxious.         Physical Exam:     Vitals:    04/30/19 0924   BP: 123/79   Pulse: 79   Temp: 97.8 °F (36.6 °C)       Physical Exam   Constitutional: He is oriented to person, place, and time. He appears well-developed and well-nourished. No distress.   HENT:   Head: Normocephalic and atraumatic.   Mouth/Throat: Oropharynx is clear and moist. No oropharyngeal exudate.   Eyes: Pupils are equal, round, and reactive to light. Conjunctivae and EOM are normal.   Neck: Normal range of motion. Neck supple. No JVD present. No tracheal deviation present. No thyromegaly present.   Cardiovascular: Normal rate, regular rhythm and normal heart sounds. Exam reveals no friction rub.   No murmur heard.  Pulmonary/Chest: Effort normal and breath sounds normal. No stridor. No respiratory distress. He has no wheezes. He has no rales. He exhibits no tenderness.   Abdominal: Soft. Bowel sounds are normal. He exhibits no distension. There is no tenderness. There is no rebound and no guarding.   Musculoskeletal: Normal range of motion. He exhibits no edema, tenderness or  deformity.   Lymphadenopathy:     He has no axillary adenopathy.   Neurological: He is alert and oriented to person, place, and time. He displays normal reflexes. No cranial nerve deficit or sensory deficit. He exhibits normal muscle tone. Coordination normal.   Skin: Skin is warm and dry. Capillary refill takes less than 2 seconds. No rash noted. He is not diaphoretic. No erythema. No pallor.        Numerous forarm tatoos  erythematous scaly circular lesion   Psychiatric: He has a normal mood and affect. His behavior is normal. Judgment and thought content normal.       ECOG Performance Status: (foot note - ECOG PS provided by Eastern Cooperative Oncology Group) 0 - Asymptomatic    Karnofsky Performance Score:  100%- Normal, No Complaints, No Evidence of Disease    Labs:   Lab Results   Component Value Date    WBC 9.02 04/29/2019    HGB 13.6 (L) 04/29/2019    HCT 38.5 (L) 04/29/2019     04/29/2019    ALT 16 04/29/2019    AST 18 04/29/2019     04/29/2019    K 4.2 04/29/2019     04/29/2019    CREATININE 0.8 04/29/2019    BUN 13 04/29/2019    CO2 27 04/29/2019    INR 1.0 08/05/2014     LDH:132    Imaging: Previous imaging has been personally reviewed     Assessment and Plan:     Mr. Johnston is a 34 year old male who present post ASCT for lymphoma    DLBCL  --Doing well 5 years post ASCT  --All labs are within normal limits   --Denies all B symptoms  --Continue to follow with twice yearly physical exams    Tobacco Use  --He has returned to smoking cigarettes  --The importance of all tobacco cessation was discussed   --Medication and counseling were both offered, he is not willing to discuss either    Erectile Dysfunction  --Possibly related to extensive previous chemotherapy  --Will refer to urology    Erythematous lesion  --Possible fungal vs scaly lesion  --Will refer to pcp for health maintenance     He missed many appointments for restaging over the past 2 years and given his normal blood counts  I don't feel he needs another bone marrow at this time.    30 minutes were spent face to face with the patient to discuss the disease, natural history, treatment options and survival statistics. I have provided the patient with an opportunity to ask questions and have all questions answered to his satisfaction.       he will return to clinic in 6 months, but knows to call in the interim if symptoms change or should a problem arise.      Homa Gomez MD  Hematology and Medical Oncology  Bone Marrow Transplant  [facility]

## 2019-04-30 NOTE — TELEPHONE ENCOUNTER
Called patient to give him the number to the [facility] clinic to schedule a primary care physician appointment. There was no voice mail set up to leave a message.        ----- Message from Homa Gomez MD sent at 4/30/2019  1:17 PM CDT -----  1. Referral to PCP  2. See me in 6 months with cbc,cmp

## 2019-04-30 NOTE — TELEPHONE ENCOUNTER
Scheduled first available for 6/24 at [facility] internal medicine. Mailed appt slip.

## 2019-05-16 ENCOUNTER — HOSPITAL ENCOUNTER (EMERGENCY)
Facility: HOSPITAL | Age: 34
Discharge: HOME | End: 2019-05-16
Attending: EMERGENCY MEDICINE
Payer: MEDICARE

## 2019-05-16 VITALS
BODY MASS INDEX: 27.32 KG/M2 | DIASTOLIC BLOOD PRESSURE: 73 MMHG | WEIGHT: 170 LBS | SYSTOLIC BLOOD PRESSURE: 131 MMHG | OXYGEN SATURATION: 97 % | TEMPERATURE: 99 F | HEIGHT: 66 IN | RESPIRATION RATE: 18 BRPM | HEART RATE: 94 BPM

## 2019-05-16 DIAGNOSIS — S61.431A PUNCTURE WOUND OF RIGHT HAND WITHOUT FOREIGN BODY, INITIAL ENCOUNTER: Primary | ICD-10-CM

## 2019-05-16 PROCEDURE — 90715 TDAP VACCINE 7 YRS/> IM: CPT | Performed by: EMERGENCY MEDICINE

## 2019-05-16 PROCEDURE — 90471 IMMUNIZATION ADMIN: CPT | Performed by: EMERGENCY MEDICINE

## 2019-05-16 PROCEDURE — 99284 EMERGENCY DEPT VISIT MOD MDM: CPT | Mod: 25

## 2019-05-16 PROCEDURE — 63600175 PHARM REV CODE 636 W HCPCS: Performed by: EMERGENCY MEDICINE

## 2019-05-16 RX ORDER — CEPHALEXIN 250 MG/1
500 CAPSULE ORAL 4 TIMES DAILY
Qty: 40 CAPSULE | Refills: 0 | Status: SHIPPED | OUTPATIENT
Start: 2019-05-16 | End: 2019-05-21

## 2019-05-16 RX ORDER — IBUPROFEN 600 MG/1
600 TABLET ORAL EVERY 6 HOURS PRN
Qty: 20 TABLET | Refills: 0 | OUTPATIENT
Start: 2019-05-16 | End: 2021-08-21

## 2019-05-16 RX ADMIN — CLOSTRIDIUM TETANI TOXOID ANTIGEN (FORMALDEHYDE INACTIVATED), CORYNEBACTERIUM DIPHTHERIAE TOXOID ANTIGEN (FORMALDEHYDE INACTIVATED), BORDETELLA PERTUSSIS TOXOID ANTIGEN (GLUTARALDEHYDE INACTIVATED), BORDETELLA PERTUSSIS FILAMENTOUS HEMAGGLUTININ ANTIGEN (FORMALDEHYDE INACTIVATED), BORDETELLA PERTUSSIS PERTACTIN ANTIGEN, AND BORDETELLA PERTUSSIS FIMBRIAE 2/3 ANTIGEN 0.5 ML: 5; 2; 2.5; 5; 3; 5 INJECTION, SUSPENSION INTRAMUSCULAR at 09:05

## 2019-05-16 NOTE — ED PROVIDER NOTES
Encounter Date: 2019    SCRIBE #1 NOTE: I, Wendy Milton, am scribing for, and in the presence of,  Dr. Lefort. I have scribed the entire note.       History     Chief Complaint   Patient presents with    Hand Injury     Was at work when nail went through back of R hand. Denies numbness. Displays good ROM.      Tom Johnston is a 34 y.o. male who  has a past medical history of Bone marrow transplant status, Cancer, Chronic back pain, and Reflux.    The patient presents to the ED due to a right hand injury. Patient states that he was at work when a nail punctured the dorsum of his hand. Reports throbbing pain of the right hand. Unsure of last tetanus shot.           The history is provided by the patient.     Review of patient's allergies indicates:  No Known Allergies  Past Medical History:   Diagnosis Date    Bone marrow transplant status     Cancer     non-hogkins lymphoma    Chronic back pain     Reflux      Past Surgical History:   Procedure Laterality Date    INSERTION-PERM-A-CATH VAS CATH Right 2014    [facility]    lymph node biopsies       Family History   Problem Relation Age of Onset    Hypertension Mother     Hypertension Father      Social History     Tobacco Use    Smoking status: Former Smoker     Packs/day: 0.25     Years: 17.00     Pack years: 4.25     Types: Cigarettes     Start date: 2014     Last attempt to quit: 2014     Years since quittin.8    Smokeless tobacco: Former User     Types: Snuff, Chew     Quit date: 3/21/2014   Substance Use Topics    Alcohol use: Yes     Alcohol/week: 0.0 - 0.5 oz     Comment: beer every other weekend    Drug use: Yes     Frequency: 7.0 times per week     Types: Marijuana     Review of Systems   Constitutional: Negative for chills and fever.   HENT: Negative for congestion, ear pain, rhinorrhea and sore throat.    Respiratory: Negative for cough, shortness of breath and  wheezing.    Cardiovascular: Negative for chest pain and palpitations.   Gastrointestinal: Negative for abdominal pain, diarrhea, nausea and vomiting.   Genitourinary: Negative for dysuria and hematuria.   Musculoskeletal: Positive for arthralgias (right hand). Negative for back pain, myalgias and neck pain.   Skin: Negative for rash.   Neurological: Negative for dizziness, weakness, light-headedness and headaches.   Psychiatric/Behavioral: Negative for confusion.       Physical Exam     Initial Vitals [05/16/19 0830]   BP Pulse Resp Temp SpO2   135/76 95 20 98.3 °F (36.8 °C) 96 %      MAP       --         Physical Exam    Nursing note and vitals reviewed.  Constitutional: He appears well-developed and well-nourished. He is not diaphoretic. No distress.   HENT:   Head: Normocephalic and atraumatic.   Mouth/Throat: Oropharynx is clear and moist.   Eyes: Conjunctivae and EOM are normal.   Neck: Normal range of motion. Neck supple.   Cardiovascular: Normal rate, regular rhythm and normal heart sounds. Exam reveals no gallop and no friction rub.    No murmur heard.  Pulmonary/Chest: Breath sounds normal. He has no wheezes. He has no rhonchi. He has no rales.   Abdominal: Soft. There is no tenderness. There is no rebound and no guarding.   Musculoskeletal: Normal range of motion. He exhibits edema and tenderness.   No crepitus right hand.  Soft tissue swelling of the hypothenar eminence.  Solitary puncture wound over midshaft of 5th metacarpal.  Bleeding controlled.  No tenderness over 4th metacarpal.   Full strength flexion and extension of 5 metacarpal.  Full sensation 5th digit.  Good capillary refill.         Lymphadenopathy:     He has no cervical adenopathy.   Neurological: He is alert and oriented to person, place, and time. He has normal strength. No sensory deficit.   Skin: Skin is warm and dry. Capillary refill takes less than 2 seconds. No rash noted.         ED Course   Procedures  Labs Reviewed - No data to  display       Imaging Results          X-Ray Hand 3 view Right (Final result)  Result time 05/16/19 09:00:03    Final result by Michelle Diego MD (05/16/19 09:00:03)                 Impression:      Examination most suggestive of a remote  5th metacarpal bone fracture      Electronically signed by: Michelle Diego MD  Date:    05/16/2019  Time:    09:00             Narrative:    EXAMINATION:  XR HAND COMPLETE 3 VIEW RIGHT    CLINICAL HISTORY:  PW;    TECHNIQUE:  PA, lateral, and oblique views of the right hand were performed.    COMPARISON:  None    FINDINGS:  There is mild volar angulation of the 5th metacarpal bone most suggestive of a remote fracture.  There is no well define acute lines of fracture seen.  No significant degenerative change.  The radiocarpal joints and carpal bones appear normal.  The soft tissues appear normal.                                 Medical Decision Making:   History:   Old Medical Records: I decided to obtain old medical records.  Initial Assessment:   Wound appears free of foreign body, neurovascular intact  Differential Diagnosis:   Fracture, foreign body, nerve or tendon injury, hematoma  Independently Interpreted Test(s):   I have ordered and independently interpreted X-rays - see summary below.       <> Summary of X-Ray Reading(s): Remote old healed fracture, no acute fracture, no foreign bodies  Clinical Tests:   Radiological Study: Ordered and Reviewed  ED Management:  High risk wound, will cover with prophylactic antibiotics.  Tetanus updated.  Wound cleansed department.  Discussed close follow-up for wound check, indications to ED return, expected course of injury as well as potential complications including damage to tendon structures, retained foreign body not visualized on imaging, potential for infection despite prophylactic antibiotics.                      Clinical Impression:     1. Puncture wound of right hand without foreign body, initial encounter             Disposition:   Disposition: Discharged  Condition: Stable    Scribe attestation I, Dr. Guy Lefort, personally performed the services described in this documentation. All medical record entries made by the scribe were at my direction and in my presence. I have reviewed the chart and agree that the record reflects my personal performance and is accurate and complete. Guy Lefort, MD.  9:42 AM 05/16/2019                      Guy J. Lefort, MD  05/16/19 0944

## 2020-07-17 DIAGNOSIS — Z71.89 COMPLEX CARE COORDINATION: ICD-10-CM

## 2020-11-02 ENCOUNTER — TELEPHONE (OUTPATIENT)
Dept: HEMATOLOGY/ONCOLOGY | Facility: CLINIC | Age: 35
End: 2020-11-02

## 2020-11-02 NOTE — TELEPHONE ENCOUNTER
----- Message from Jahaira Powers sent at 11/2/2020 12:03 PM CST -----  Name Of Caller: Tom     Provider Name: Jason Luther     Does patient feel the need to be seen today? No     Relationship to the Pt?:  Patient     Contact Preference?: [phone]    What is the nature of the call?: Patient called and would like to schedule an appointment please call back patient

## 2020-11-02 NOTE — TELEPHONE ENCOUNTER
----- Message from Teresita Bernard sent at 11/2/2020  3:30 PM CST -----  Pt is returning a call to the nurse      Pt contact [phone]

## 2020-11-09 ENCOUNTER — LAB VISIT (OUTPATIENT)
Dept: LAB | Facility: HOSPITAL | Age: 35
End: 2020-11-09

## 2020-11-09 ENCOUNTER — OFFICE VISIT (OUTPATIENT)
Dept: HEMATOLOGY/ONCOLOGY | Facility: CLINIC | Age: 35
End: 2020-11-09

## 2020-11-09 VITALS
OXYGEN SATURATION: 98 % | WEIGHT: 197.63 LBS | RESPIRATION RATE: 16 BRPM | HEIGHT: 68 IN | BODY MASS INDEX: 29.95 KG/M2 | HEART RATE: 89 BPM | TEMPERATURE: 98 F | SYSTOLIC BLOOD PRESSURE: 130 MMHG | DIASTOLIC BLOOD PRESSURE: 79 MMHG

## 2020-11-09 DIAGNOSIS — C83.30: ICD-10-CM

## 2020-11-09 DIAGNOSIS — E11.40 DIABETES MELLITUS WITH NEUROPATHY CAUSING ERECTILE DYSFUNCTION: ICD-10-CM

## 2020-11-09 DIAGNOSIS — N52.1 DIABETES MELLITUS WITH NEUROPATHY CAUSING ERECTILE DYSFUNCTION: ICD-10-CM

## 2020-11-09 DIAGNOSIS — C83.38 DIFFUSE LARGE B-CELL LYMPHOMA OF LYMPH NODES OF MULTIPLE REGIONS: Primary | ICD-10-CM

## 2020-11-09 DIAGNOSIS — Z94.84 HISTORY OF PERIPHERAL STEM CELL TRANSPLANT: ICD-10-CM

## 2020-11-09 LAB
ALBUMIN SERPL BCP-MCNC: 4.2 G/DL (ref 3.5–5.2)
ALP SERPL-CCNC: 101 U/L (ref 55–135)
ALT SERPL W/O P-5'-P-CCNC: 43 U/L (ref 10–44)
ANION GAP SERPL CALC-SCNC: 9 MMOL/L (ref 8–16)
AST SERPL-CCNC: 29 U/L (ref 10–40)
BILIRUB SERPL-MCNC: 0.6 MG/DL (ref 0.1–1)
BUN SERPL-MCNC: 13 MG/DL (ref 6–20)
CALCIUM SERPL-MCNC: 9.8 MG/DL (ref 8.7–10.5)
CHLORIDE SERPL-SCNC: 105 MMOL/L (ref 95–110)
CO2 SERPL-SCNC: 27 MMOL/L (ref 23–29)
CREAT SERPL-MCNC: 1 MG/DL (ref 0.5–1.4)
ERYTHROCYTE [DISTWIDTH] IN BLOOD BY AUTOMATED COUNT: 12.1 % (ref 11.5–14.5)
EST. GFR  (AFRICAN AMERICAN): >60 ML/MIN/1.73 M^2
EST. GFR  (NON AFRICAN AMERICAN): >60 ML/MIN/1.73 M^2
GLUCOSE SERPL-MCNC: 130 MG/DL (ref 70–110)
HCT VFR BLD AUTO: 41.3 % (ref 40–54)
HGB BLD-MCNC: 14.6 G/DL (ref 14–18)
IMM GRANULOCYTES # BLD AUTO: 0.03 K/UL (ref 0–0.04)
LDH SERPL L TO P-CCNC: 191 U/L (ref 110–260)
MCH RBC QN AUTO: 32.4 PG (ref 27–31)
MCHC RBC AUTO-ENTMCNC: 35.4 G/DL (ref 32–36)
MCV RBC AUTO: 92 FL (ref 82–98)
NEUTROPHILS # BLD AUTO: 7.1 K/UL (ref 1.8–7.7)
PLATELET # BLD AUTO: 222 K/UL (ref 150–350)
PMV BLD AUTO: 10.2 FL (ref 9.2–12.9)
POTASSIUM SERPL-SCNC: 4.4 MMOL/L (ref 3.5–5.1)
PROT SERPL-MCNC: 7 G/DL (ref 6–8.4)
RBC # BLD AUTO: 4.51 M/UL (ref 4.6–6.2)
SODIUM SERPL-SCNC: 141 MMOL/L (ref 136–145)
WBC # BLD AUTO: 10.22 K/UL (ref 3.9–12.7)

## 2020-11-09 PROCEDURE — 83615 LACTATE (LD) (LDH) ENZYME: CPT

## 2020-11-09 PROCEDURE — 99215 PR OFFICE/OUTPT VISIT, EST, LEVL V, 40-54 MIN: ICD-10-PCS | Mod: S$PBB,BMT,, | Performed by: INTERNAL MEDICINE

## 2020-11-09 PROCEDURE — 99999 PR PBB SHADOW E&M-EST. PATIENT-LVL III: ICD-10-PCS | Mod: PBBFAC,BMT,, | Performed by: INTERNAL MEDICINE

## 2020-11-09 PROCEDURE — 36415 COLL VENOUS BLD VENIPUNCTURE: CPT

## 2020-11-09 PROCEDURE — 99215 OFFICE O/P EST HI 40 MIN: CPT | Mod: S$PBB,BMT,, | Performed by: INTERNAL MEDICINE

## 2020-11-09 PROCEDURE — 99213 OFFICE O/P EST LOW 20 MIN: CPT | Mod: PBBFAC | Performed by: INTERNAL MEDICINE

## 2020-11-09 PROCEDURE — 99999 PR PBB SHADOW E&M-EST. PATIENT-LVL III: CPT | Mod: PBBFAC,BMT,, | Performed by: INTERNAL MEDICINE

## 2020-11-09 PROCEDURE — 85027 COMPLETE CBC AUTOMATED: CPT

## 2020-11-09 PROCEDURE — 80053 COMPREHEN METABOLIC PANEL: CPT

## 2020-11-09 NOTE — PROGRESS NOTES
Hematology and Medical Oncology   Follow Up Note     11/09/2020    Primary Oncologic Diagnosis:DLBCL s/p ASCT in 2014    History of Present Ilness:   Tom Johnston (Tom) is a pleasant 35 y.o.male who presents for his annual visit.    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 It was treated with surgery followed by adriamycin/cyclophosphamide at [facility] in [place].   --In July 2012 he presented with vomiting and back pain. Its work-up included a CT scan that revealed multiple liver and spleen lesions as well as retrocrural, gelacio hepatis, gastrohepatic and peripancreatic lymphadenopathy. He was also found to have right axillary adenopathy which was biopsied on 7/12/12 demonstrating diffuse large B cell lymphoma. (Specimen# [id number]; [facility])   --Mr. Johnston reports that there were a controversy of whether his diagnosis being Hodgkins' lymphoma vs. Non-Hodgkins before the final announcement of diffuse large B cell lymphoma. Nor the pathology sample is available for review nor cytogenetics or molecular studies. LDH was 1055 and CMV was found positive at that time. HIV was negative.    --Subsequently he was started on R-EPOCH without adriamycin under the direction of Dr. Ambriz finishing 8th cycle on 3/1/13. After this, he underwent a CT scan which demonstrated mareked improvement of all the lesions except for a splenic mass of 3.3cm. This was surveillanced with a repeat CT scan in 11/2013 demonstrating enlargement of splenic masses and interval development of 30 hypodense areas in the liver. He was given 1 cycle of ritixumab/cyclophosphamide/oncovin/doxil/prednisone on 11/11/13 and referred to [facility]. Bone marrow biopsy did not reveal any sign of lymphoma. Liver mass was attempted to be IR-guided FNA biopsied, but it was inconclusive. He was admitted to the inpatient service and started first cycle of salvage RICE on 12/16/13. He  finished three more cycles of RICE 1/15/14, 2/27/14 and 4/7/14.   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 There were apparently small splenic lesions as well but no mention of size on that study. PET scan could not be obtained related to insurance prohibition.   --Gallium scan done 6/6/14 and 12/4/14 were negative. Hence, he appeared to be in a CR prior to transplant which continued at day 114.   --Bone marrow done 6/11/14 showed 50% cellularity with trilineage hematopoiesis and no evidence of lymphoma. Cytogenetics 46,XY    Interval History:   Mr. Johnston is doing well. He still works in a boiler room. The blurry vision discussed at the last visit is subjectively better. He has no new issues or complaints.     Past Medical History:   Past Medical History:   Diagnosis Date    Bone marrow transplant status     Cancer     non-hogkins lymphoma    Chronic back pain     Reflux        Current Medications:   Current Outpatient Medications   Medication Sig    albuterol (PROVENTIL/VENTOLIN HFA) 90 mcg/actuation inhaler Inhale 1-2 puffs into the lungs every 6 (six) hours as needed. Rescue (Patient not taking: Reported on 11/9/2020)    azithromycin (Z-PAVAN) 250 MG tablet Take 1 tablet (250 mg total) by mouth once daily. Take first 2 tablets together, then 1 every day until finished. (Patient not taking: Reported on 11/9/2020)    budesonide (PULMICORT FLEXHALER) 90 mcg/actuation AePB Inhale 2 puffs (180 mcg total) into the lungs 2 (two) times a day. Controller (Patient not taking: Reported on 11/9/2020)    ibuprofen (ADVIL,MOTRIN) 600 MG tablet Take 1 tablet (600 mg total) by mouth every 6 (six) hours as needed for Pain. (Patient not taking: Reported on  11/9/2020)     No current facility-administered medications for this visit.      ALLERGIES:   Review of patient's allergies indicates:  No Known Allergies    Review of Systems:     Review of Systems   Constitutional: Positive for fatigue. Negative for appetite change, chills, diaphoresis, fever and unexpected weight change.   HENT:   Negative for hearing loss, mouth sores, nosebleeds, sore throat, trouble swallowing and voice change.    Eyes: Negative for eye problems and icterus.   Respiratory: Negative for chest tightness, cough, hemoptysis, shortness of breath and wheezing.    Cardiovascular: Negative for chest pain, leg swelling and palpitations.   Gastrointestinal: Negative for abdominal distention, abdominal pain, blood in stool, diarrhea, nausea and vomiting.   Endocrine: Negative for hot flashes.   Genitourinary: Negative for bladder incontinence, difficulty urinating, dysuria and hematuria.    Musculoskeletal: Negative for arthralgias, back pain, flank pain, gait problem, myalgias, neck pain and neck stiffness.   Skin: Negative for itching, rash and wound.   Neurological: Negative for dizziness, extremity weakness, gait problem, headaches, numbness, seizures and speech difficulty.   Hematological: Negative for adenopathy. Does not bruise/bleed easily.   Psychiatric/Behavioral: Negative for confusion, depression and sleep disturbance. The patient is not nervous/anxious.         Physical Exam:     Vitals:    11/09/20 1408   BP: 130/79   Pulse: 89   Resp: 16   Temp: 98.3 °F (36.8 °C)       Physical Exam  Constitutional:       General: He is not in acute distress.     Appearance: He is well-developed. He is not diaphoretic.   HENT:      Head: Normocephalic and atraumatic.      Mouth/Throat:      Pharynx: No oropharyngeal exudate.   Eyes:      Conjunctiva/sclera: Conjunctivae normal.      Pupils: Pupils are equal, round, and reactive to light.   Neck:      Musculoskeletal: Normal range of motion and neck supple.       Thyroid: No thyromegaly.      Vascular: No JVD.      Trachea: No tracheal deviation.   Cardiovascular:      Rate and Rhythm: Normal rate and regular rhythm.      Heart sounds: Normal heart sounds. No murmur. No friction rub.   Pulmonary:      Effort: Pulmonary effort is normal. No respiratory distress.      Breath sounds: Normal breath sounds. No stridor. No wheezing or rales.   Chest:      Chest wall: No tenderness.   Abdominal:      General: Bowel sounds are normal. There is no distension.      Palpations: Abdomen is soft.      Tenderness: There is no abdominal tenderness. There is no guarding or rebound.   Musculoskeletal: Normal range of motion.         General: No tenderness or deformity.   Skin:     General: Skin is warm and dry.      Capillary Refill: Capillary refill takes less than 2 seconds.      Coloration: Skin is not pale.      Findings: No erythema or rash.             Comments: Numerous forarm tatoos  erythematous scaly circular lesion   Neurological:      Mental Status: He is alert and oriented to person, place, and time.      Cranial Nerves: No cranial nerve deficit.      Sensory: No sensory deficit.      Motor: No abnormal muscle tone.      Coordination: Coordination normal.      Deep Tendon Reflexes: Reflexes normal.   Psychiatric:         Behavior: Behavior normal.         Thought Content: Thought content normal.         Judgment: Judgment normal.         ECOG Performance Status: (foot note - ECOG PS provided by Eastern Cooperative Oncology Group) 0 - Asymptomatic    Karnofsky Performance Score:  100%- Normal, No Complaints, No Evidence of Disease    Labs:   Lab Results   Component Value Date    WBC 10.22 11/09/2020    HGB 14.6 11/09/2020    HCT 41.3 11/09/2020     11/09/2020    ALT 16 04/29/2019    AST 18 04/29/2019     04/29/2019    K 4.2 04/29/2019     04/29/2019    CREATININE 0.8 04/29/2019    BUN 13 04/29/2019    CO2 27 04/29/2019    INR 1.0 08/05/2014      LDH:132    Imaging: Previous imaging has been personally reviewed     Assessment and Plan:     Mr. Johnston is a 35 year old male who present post ASCT for lymphoma    DLBCL  --Doing well 6 years post ASCT  --All labs are within normal limits   --Denies all B symptoms  --Continue to follow with yearly physical exams    Tobacco Use  --He has returned to smoking cigarettes  --The importance of all tobacco cessation was discussed   --Medication and counseling were both offered, he is not willing to discuss either    Erectile Dysfunction  --Possibly related to extensive previous chemotherapy  --Will refer to urology    Erythematous lesion  --Possible fungal vs scaly lesion  --Will refer to pcp for health maintenance     He missed many appointments for restaging over the past 2 years and given his normal blood counts I don't feel he needs another bone marrow at this time.    30 minutes were spent face to face with the patient to discuss the disease, natural history, treatment options and survival statistics. I have provided the patient with an opportunity to ask questions and have all questions answered to his satisfaction.       he will return to clinic in 12 months, but knows to call in the interim if symptoms change or should a problem arise.      Homa Gomez MD  Hematology and Medical Oncology  Bone Marrow Transplant  [facility]

## 2021-03-10 ENCOUNTER — TELEPHONE (OUTPATIENT)
Dept: HEMATOLOGY/ONCOLOGY | Facility: CLINIC | Age: 36
End: 2021-03-10

## 2021-04-09 DIAGNOSIS — C83.38 DIFFUSE LARGE B-CELL LYMPHOMA OF LYMPH NODES OF MULTIPLE REGIONS: Primary | ICD-10-CM

## 2021-04-12 ENCOUNTER — OFFICE VISIT (OUTPATIENT)
Dept: HEMATOLOGY/ONCOLOGY | Facility: CLINIC | Age: 36
End: 2021-04-12

## 2021-04-12 ENCOUNTER — LAB VISIT (OUTPATIENT)
Dept: LAB | Facility: HOSPITAL | Age: 36
End: 2021-04-12

## 2021-04-12 VITALS
TEMPERATURE: 98 F | OXYGEN SATURATION: 96 % | DIASTOLIC BLOOD PRESSURE: 79 MMHG | WEIGHT: 181.69 LBS | BODY MASS INDEX: 29.32 KG/M2 | HEART RATE: 92 BPM | RESPIRATION RATE: 16 BRPM | SYSTOLIC BLOOD PRESSURE: 128 MMHG

## 2021-04-12 DIAGNOSIS — C83.38 DIFFUSE LARGE B-CELL LYMPHOMA OF LYMPH NODES OF MULTIPLE REGIONS: Primary | ICD-10-CM

## 2021-04-12 DIAGNOSIS — C83.38 DIFFUSE LARGE B-CELL LYMPHOMA OF LYMPH NODES OF MULTIPLE REGIONS: ICD-10-CM

## 2021-04-12 DIAGNOSIS — E87.6 HYPOKALEMIA: ICD-10-CM

## 2021-04-12 LAB
ALBUMIN SERPL BCP-MCNC: 4.4 G/DL (ref 3.5–5.2)
ALP SERPL-CCNC: 113 U/L (ref 55–135)
ALT SERPL W/O P-5'-P-CCNC: 27 U/L (ref 10–44)
ANION GAP SERPL CALC-SCNC: 8 MMOL/L (ref 8–16)
AST SERPL-CCNC: 22 U/L (ref 10–40)
BASOPHILS # BLD AUTO: 0.03 K/UL (ref 0–0.2)
BASOPHILS NFR BLD: 0.3 % (ref 0–1.9)
BILIRUB SERPL-MCNC: 0.5 MG/DL (ref 0.1–1)
BUN SERPL-MCNC: 16 MG/DL (ref 6–20)
CALCIUM SERPL-MCNC: 9.4 MG/DL (ref 8.7–10.5)
CHLORIDE SERPL-SCNC: 104 MMOL/L (ref 95–110)
CO2 SERPL-SCNC: 28 MMOL/L (ref 23–29)
CREAT SERPL-MCNC: 0.8 MG/DL (ref 0.5–1.4)
DIFFERENTIAL METHOD: ABNORMAL
EOSINOPHIL # BLD AUTO: 0.2 K/UL (ref 0–0.5)
EOSINOPHIL NFR BLD: 1.9 % (ref 0–8)
ERYTHROCYTE [DISTWIDTH] IN BLOOD BY AUTOMATED COUNT: 12.6 % (ref 11.5–14.5)
EST. GFR  (AFRICAN AMERICAN): >60 ML/MIN/1.73 M^2
EST. GFR  (NON AFRICAN AMERICAN): >60 ML/MIN/1.73 M^2
GLUCOSE SERPL-MCNC: 117 MG/DL (ref 70–110)
HCT VFR BLD AUTO: 39.6 % (ref 40–54)
HGB BLD-MCNC: 14.1 G/DL (ref 14–18)
IMM GRANULOCYTES # BLD AUTO: 0.04 K/UL (ref 0–0.04)
IMM GRANULOCYTES NFR BLD AUTO: 0.4 % (ref 0–0.5)
LDH SERPL L TO P-CCNC: 202 U/L (ref 110–260)
LYMPHOCYTES # BLD AUTO: 1.6 K/UL (ref 1–4.8)
LYMPHOCYTES NFR BLD: 16.5 % (ref 18–48)
MCH RBC QN AUTO: 32.2 PG (ref 27–31)
MCHC RBC AUTO-ENTMCNC: 35.6 G/DL (ref 32–36)
MCV RBC AUTO: 90 FL (ref 82–98)
MONOCYTES # BLD AUTO: 0.9 K/UL (ref 0.3–1)
MONOCYTES NFR BLD: 9.1 % (ref 4–15)
NEUTROPHILS # BLD AUTO: 7.1 K/UL (ref 1.8–7.7)
NEUTROPHILS NFR BLD: 71.8 % (ref 38–73)
NRBC BLD-RTO: 0 /100 WBC
PLATELET # BLD AUTO: 242 K/UL (ref 150–450)
PMV BLD AUTO: 10.4 FL (ref 9.2–12.9)
POTASSIUM SERPL-SCNC: 3.9 MMOL/L (ref 3.5–5.1)
PROT SERPL-MCNC: 7.1 G/DL (ref 6–8.4)
RBC # BLD AUTO: 4.38 M/UL (ref 4.6–6.2)
SODIUM SERPL-SCNC: 140 MMOL/L (ref 136–145)
WBC # BLD AUTO: 9.91 K/UL (ref 3.9–12.7)

## 2021-04-12 PROCEDURE — 80053 COMPREHEN METABOLIC PANEL: CPT | Performed by: INTERNAL MEDICINE

## 2021-04-12 PROCEDURE — 99215 OFFICE O/P EST HI 40 MIN: CPT | Mod: S$PBB,BMT,, | Performed by: INTERNAL MEDICINE

## 2021-04-12 PROCEDURE — 99215 PR OFFICE/OUTPT VISIT, EST, LEVL V, 40-54 MIN: ICD-10-PCS | Mod: S$PBB,BMT,, | Performed by: INTERNAL MEDICINE

## 2021-04-12 PROCEDURE — 36415 COLL VENOUS BLD VENIPUNCTURE: CPT | Performed by: INTERNAL MEDICINE

## 2021-04-12 PROCEDURE — 99999 PR PBB SHADOW E&M-EST. PATIENT-LVL II: CPT | Mod: PBBFAC,BMT,, | Performed by: INTERNAL MEDICINE

## 2021-04-12 PROCEDURE — 99212 OFFICE O/P EST SF 10 MIN: CPT | Mod: PBBFAC | Performed by: INTERNAL MEDICINE

## 2021-04-12 PROCEDURE — 85025 COMPLETE CBC W/AUTO DIFF WBC: CPT | Performed by: INTERNAL MEDICINE

## 2021-04-12 PROCEDURE — 99999 PR PBB SHADOW E&M-EST. PATIENT-LVL II: ICD-10-PCS | Mod: PBBFAC,BMT,, | Performed by: INTERNAL MEDICINE

## 2021-04-12 PROCEDURE — 83615 LACTATE (LD) (LDH) ENZYME: CPT | Performed by: INTERNAL MEDICINE

## 2021-07-07 ENCOUNTER — TELEPHONE (OUTPATIENT)
Dept: HEMATOLOGY/ONCOLOGY | Facility: CLINIC | Age: 36
End: 2021-07-07

## 2021-07-16 ENCOUNTER — TELEPHONE (OUTPATIENT)
Dept: HEMATOLOGY/ONCOLOGY | Facility: CLINIC | Age: 36
End: 2021-07-16

## 2022-05-27 ENCOUNTER — LAB VISIT (OUTPATIENT)
Dept: LAB | Facility: HOSPITAL | Age: 37
End: 2022-05-27

## 2022-05-27 DIAGNOSIS — C83.30: ICD-10-CM

## 2022-05-27 LAB
ALBUMIN SERPL BCP-MCNC: 4.4 G/DL (ref 3.5–5.2)
ALP SERPL-CCNC: 91 U/L (ref 55–135)
ALT SERPL W/O P-5'-P-CCNC: 19 U/L (ref 10–44)
ANION GAP SERPL CALC-SCNC: 7 MMOL/L (ref 8–16)
AST SERPL-CCNC: 19 U/L (ref 10–40)
BILIRUB SERPL-MCNC: 0.7 MG/DL (ref 0.1–1)
BUN SERPL-MCNC: 13 MG/DL (ref 6–20)
CALCIUM SERPL-MCNC: 9.6 MG/DL (ref 8.7–10.5)
CHLORIDE SERPL-SCNC: 106 MMOL/L (ref 95–110)
CO2 SERPL-SCNC: 26 MMOL/L (ref 23–29)
CREAT SERPL-MCNC: 0.8 MG/DL (ref 0.5–1.4)
ERYTHROCYTE [DISTWIDTH] IN BLOOD BY AUTOMATED COUNT: 12.2 % (ref 11.5–14.5)
EST. GFR  (AFRICAN AMERICAN): >60 ML/MIN/1.73 M^2
EST. GFR  (NON AFRICAN AMERICAN): >60 ML/MIN/1.73 M^2
GLUCOSE SERPL-MCNC: 94 MG/DL (ref 70–110)
HCT VFR BLD AUTO: 39 % (ref 40–54)
HGB BLD-MCNC: 13.9 G/DL (ref 14–18)
IMM GRANULOCYTES # BLD AUTO: 0.02 K/UL (ref 0–0.04)
LDH SERPL L TO P-CCNC: 174 U/L (ref 110–260)
MCH RBC QN AUTO: 32.5 PG (ref 27–31)
MCHC RBC AUTO-ENTMCNC: 35.6 G/DL (ref 32–36)
MCV RBC AUTO: 91 FL (ref 82–98)
NEUTROPHILS # BLD AUTO: 6.8 K/UL (ref 1.8–7.7)
PLATELET # BLD AUTO: 234 K/UL (ref 150–450)
PMV BLD AUTO: 10.4 FL (ref 9.2–12.9)
POTASSIUM SERPL-SCNC: 4.3 MMOL/L (ref 3.5–5.1)
PROT SERPL-MCNC: 6.7 G/DL (ref 6–8.4)
RBC # BLD AUTO: 4.28 M/UL (ref 4.6–6.2)
SODIUM SERPL-SCNC: 139 MMOL/L (ref 136–145)
WBC # BLD AUTO: 9.83 K/UL (ref 3.9–12.7)

## 2022-05-27 PROCEDURE — 80053 COMPREHEN METABOLIC PANEL: CPT | Performed by: INTERNAL MEDICINE

## 2022-05-27 PROCEDURE — 85027 COMPLETE CBC AUTOMATED: CPT | Performed by: INTERNAL MEDICINE

## 2022-05-27 PROCEDURE — 83615 LACTATE (LD) (LDH) ENZYME: CPT | Performed by: INTERNAL MEDICINE

## 2022-05-27 PROCEDURE — 36415 COLL VENOUS BLD VENIPUNCTURE: CPT | Performed by: INTERNAL MEDICINE

## 2022-06-03 ENCOUNTER — OFFICE VISIT (OUTPATIENT)
Dept: HEMATOLOGY/ONCOLOGY | Facility: CLINIC | Age: 37
End: 2022-06-03

## 2022-06-03 VITALS
RESPIRATION RATE: 16 BRPM | WEIGHT: 180 LBS | DIASTOLIC BLOOD PRESSURE: 67 MMHG | SYSTOLIC BLOOD PRESSURE: 138 MMHG | HEIGHT: 68 IN | BODY MASS INDEX: 27.28 KG/M2 | OXYGEN SATURATION: 98 % | TEMPERATURE: 98 F | HEART RATE: 85 BPM

## 2022-06-03 DIAGNOSIS — Z94.84 HISTORY OF PERIPHERAL STEM CELL TRANSPLANT: ICD-10-CM

## 2022-06-03 DIAGNOSIS — C83.38 DIFFUSE LARGE B-CELL LYMPHOMA OF LYMPH NODES OF MULTIPLE REGIONS: Primary | ICD-10-CM

## 2022-06-03 PROCEDURE — 99999 PR PBB SHADOW E&M-EST. PATIENT-LVL III: CPT | Mod: PBBFAC,,, | Performed by: INTERNAL MEDICINE

## 2022-06-03 PROCEDURE — 99215 PR OFFICE/OUTPT VISIT, EST, LEVL V, 40-54 MIN: ICD-10-PCS | Mod: S$PBB,,, | Performed by: INTERNAL MEDICINE

## 2022-06-03 PROCEDURE — 99213 OFFICE O/P EST LOW 20 MIN: CPT | Mod: PBBFAC | Performed by: INTERNAL MEDICINE

## 2022-06-03 PROCEDURE — 99999 PR PBB SHADOW E&M-EST. PATIENT-LVL III: ICD-10-PCS | Mod: PBBFAC,,, | Performed by: INTERNAL MEDICINE

## 2022-06-03 PROCEDURE — 99215 OFFICE O/P EST HI 40 MIN: CPT | Mod: S$PBB,,, | Performed by: INTERNAL MEDICINE

## 2022-06-03 NOTE — PROGRESS NOTES
Hematology and Medical Oncology   Follow Up Note     06/03/2022    Primary Oncologic Diagnosis:DLBCL s/p ASCT in 2014    History of Present Ilness:   Tom Johnston (Tom) is a pleasant 37 y.o.male who presents for his annual visit.    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 It was treated with surgery followed by adriamycin/cyclophosphamide at [facility] in [place].   --In July 2012 he presented with vomiting and back pain. Its work-up included a CT scan that revealed multiple liver and spleen lesions as well as retrocrural, gelacio hepatis, gastrohepatic and peripancreatic lymphadenopathy. He was also found to have right axillary adenopathy which was biopsied on 7/12/12 demonstrating diffuse large B cell lymphoma. (Specimen# [id number]; [facility])   --Mr. Johnston reports that there were a controversy of whether his diagnosis being Hodgkins' lymphoma vs. Non-Hodgkins before the final announcement of diffuse large B cell lymphoma. Nor the pathology sample is available for review nor cytogenetics or molecular studies. LDH was 1055 and CMV was found positive at that time. HIV was negative.    --Subsequently he was started on R-EPOCH without adriamycin under the direction of Dr. Ambriz finishing 8th cycle on 3/1/13. After this, he underwent a CT scan which demonstrated mareked improvement of all the lesions except for a splenic mass of 3.3cm. This was surveillanced with a repeat CT scan in 11/2013 demonstrating enlargement of splenic masses and interval development of 30 hypodense areas in the liver. He was given 1 cycle of ritixumab/cyclophosphamide/oncovin/doxil/prednisone on 11/11/13 and referred to [facility]. Bone marrow biopsy did not reveal any sign of lymphoma. Liver mass was attempted to be IR-guided FNA biopsied, but it was inconclusive. He was admitted to the inpatient service and started first cycle of salvage RICE on 12/16/13. He  finished three more cycles of RICE 1/15/14, 2/27/14 and 4/7/14.   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 There were apparently small splenic lesions as well but no mention of size on that study. PET scan could not be obtained related to insurance prohibition.   --Gallium scan done 6/6/14 and 12/4/14 were negative. Hence, he appeared to be in a CR prior to transplant which continued at day 114.   --Bone marrow done 6/11/14 showed 50% cellularity with trilineage hematopoiesis and no evidence of lymphoma. Cytogenetics 46,XY    Interval History:   Mr. Johnston is doing well. He still works in a boiler room. He has no new issues or complaints. He denies all B symptoms.     Lost to follow up for slightly over a year    Past Medical History:   Past Medical History:   Diagnosis Date    Bone marrow transplant status     Cancer     non-hogkins lymphoma    Chronic back pain     Reflux        Current Medications:   No current outpatient medications on file.     No current facility-administered medications for this visit.     ALLERGIES:   Review of patient's allergies indicates:  No Known Allergies    Review of Systems:     Review of Systems   Constitutional: Positive for fatigue. Negative for appetite change, chills, diaphoresis, fever and unexpected weight change.   HENT:   Negative for hearing loss, mouth sores, nosebleeds, sore throat, trouble swallowing and voice change.    Eyes: Negative for eye problems and icterus.   Respiratory: Negative for chest tightness, cough, hemoptysis, shortness of breath and wheezing.    Cardiovascular: Negative for chest pain, leg swelling and palpitations.   Gastrointestinal: Negative for abdominal distention, abdominal pain,  blood in stool, diarrhea, nausea and vomiting.   Endocrine: Negative for hot flashes.   Genitourinary: Negative for bladder incontinence, difficulty urinating, dysuria and hematuria.    Musculoskeletal: Negative for arthralgias, back pain, flank pain, gait problem, myalgias, neck pain and neck stiffness.   Skin: Negative for itching, rash and wound.   Neurological: Negative for dizziness, extremity weakness, gait problem, headaches, numbness, seizures and speech difficulty.   Hematological: Negative for adenopathy. Does not bruise/bleed easily.   Psychiatric/Behavioral: Negative for confusion, depression and sleep disturbance. The patient is not nervous/anxious.         Physical Exam:     Vitals:    06/03/22 1236   BP: 138/67   Pulse: 85   Resp: 16   Temp: 98.4 °F (36.9 °C)       Physical Exam  Constitutional:       General: He is not in acute distress.     Appearance: He is well-developed. He is not diaphoretic.   HENT:      Head: Normocephalic and atraumatic.      Mouth/Throat:      Pharynx: No oropharyngeal exudate.   Eyes:      Conjunctiva/sclera: Conjunctivae normal.      Pupils: Pupils are equal, round, and reactive to light.   Neck:      Thyroid: No thyromegaly.      Vascular: No JVD.      Trachea: No tracheal deviation.   Cardiovascular:      Rate and Rhythm: Normal rate and regular rhythm.      Heart sounds: Normal heart sounds. No murmur heard.    No friction rub.   Pulmonary:      Effort: Pulmonary effort is normal. No respiratory distress.      Breath sounds: Normal breath sounds. No stridor. No wheezing or rales.   Chest:      Chest wall: No tenderness.   Abdominal:      General: Bowel sounds are normal. There is no distension.      Palpations: Abdomen is soft.      Tenderness: There is no abdominal tenderness. There is no guarding or rebound.   Musculoskeletal:         General: No tenderness or deformity. Normal range of motion.      Cervical back: Normal range of motion and neck supple.   Skin:      General: Skin is warm and dry.      Capillary Refill: Capillary refill takes less than 2 seconds.      Coloration: Skin is not pale.      Findings: No erythema or rash.   Neurological:      Mental Status: He is alert and oriented to person, place, and time.      Cranial Nerves: No cranial nerve deficit.      Sensory: No sensory deficit.      Motor: No abnormal muscle tone.      Coordination: Coordination normal.      Deep Tendon Reflexes: Reflexes normal.   Psychiatric:         Behavior: Behavior normal.         Thought Content: Thought content normal.         Judgment: Judgment normal.       ECOG Performance Status: (foot note - ECOG PS provided by Eastern Cooperative Oncology Group) 0 - Asymptomatic    Karnofsky Performance Score:  100%- Normal, No Complaints, No Evidence of Disease    Labs:   Lab Results   Component Value Date    WBC 9.83 05/27/2022    HGB 13.9 (L) 05/27/2022    HCT 39.0 (L) 05/27/2022     05/27/2022    ALT 19 05/27/2022    AST 19 05/27/2022     05/27/2022    K 4.3 05/27/2022     05/27/2022    CREATININE 0.8 05/27/2022    BUN 13 05/27/2022    CO2 26 05/27/2022    INR 1.0 08/05/2014     LDH:132    Imaging: Previous imaging has been personally reviewed     Assessment and Plan:     Mr. Johnston is a 37 year old male who present post ASCT for lymphoma    DLBCL  --Doing well 7 years 9 months post ASCT  --All labs are within normal limits   --Denies all B symptoms  --Continue to follow with yearly physical exams    Tobacco Use  --He has returned to smoking cigarettes  --The importance of all tobacco cessation was discussed   --Medication and counseling were both offered, he is not willing to discuss either    Erectile Dysfunction  --Possibly related to extensive previous chemotherapy  --Will refer to urology    Erythematous lesion  --Possible fungal vs scaly lesion  --Will refer to pcp for health maintenance       30 minutes were spent face to face with the patient to discuss the  disease, natural history, treatment options and survival statistics. I have provided the patient with an opportunity to ask questions and have all questions answered to his satisfaction.       he will return to clinic in 12 months, but knows to call in the interim if symptoms change or should a problem arise.      Homa Gomez MD  Hematology and Medical Oncology  Bone Marrow Transplant  [facility]        BMT Chart Routing      Follow up with physician 1 year.   Follow up with [facility]    Labs CBC, CMP and LDH   Lab interval:     Imaging    Pharmacy appointment    Other referrals

## 2023-05-31 ENCOUNTER — OFFICE VISIT (OUTPATIENT)
Dept: HEMATOLOGY/ONCOLOGY | Facility: CLINIC | Age: 38
End: 2023-05-31
Payer: MEDICARE

## 2023-05-31 ENCOUNTER — TELEPHONE (OUTPATIENT)
Dept: HEMATOLOGY/ONCOLOGY | Facility: CLINIC | Age: 38
End: 2023-05-31
Payer: COMMERCIAL

## 2023-05-31 ENCOUNTER — LAB VISIT (OUTPATIENT)
Dept: LAB | Facility: HOSPITAL | Age: 38
End: 2023-05-31
Payer: COMMERCIAL

## 2023-05-31 VITALS
HEIGHT: 67 IN | OXYGEN SATURATION: 99 % | SYSTOLIC BLOOD PRESSURE: 117 MMHG | HEART RATE: 72 BPM | BODY MASS INDEX: 28.88 KG/M2 | WEIGHT: 184 LBS | TEMPERATURE: 98 F | DIASTOLIC BLOOD PRESSURE: 77 MMHG | RESPIRATION RATE: 16 BRPM

## 2023-05-31 DIAGNOSIS — Z94.84 HISTORY OF PERIPHERAL STEM CELL TRANSPLANT: ICD-10-CM

## 2023-05-31 DIAGNOSIS — C83.30: ICD-10-CM

## 2023-05-31 DIAGNOSIS — C83.39 DIFFUSE LARGE B-CELL LYMPHOMA OF EXTRANODAL SITE EXCLUDING SPLEEN AND OTHER SOLID ORGANS: Primary | ICD-10-CM

## 2023-05-31 LAB
ALBUMIN SERPL BCP-MCNC: 4.2 G/DL (ref 3.5–5.2)
ALP SERPL-CCNC: 94 U/L (ref 55–135)
ALT SERPL W/O P-5'-P-CCNC: 33 U/L (ref 10–44)
ANION GAP SERPL CALC-SCNC: 9 MMOL/L (ref 8–16)
AST SERPL-CCNC: 33 U/L (ref 10–40)
BILIRUB SERPL-MCNC: 0.5 MG/DL (ref 0.1–1)
BUN SERPL-MCNC: 19 MG/DL (ref 6–20)
CALCIUM SERPL-MCNC: 9.7 MG/DL (ref 8.7–10.5)
CHLORIDE SERPL-SCNC: 108 MMOL/L (ref 95–110)
CO2 SERPL-SCNC: 24 MMOL/L (ref 23–29)
CREAT SERPL-MCNC: 0.9 MG/DL (ref 0.5–1.4)
ERYTHROCYTE [DISTWIDTH] IN BLOOD BY AUTOMATED COUNT: 12.4 % (ref 11.5–14.5)
EST. GFR  (NO RACE VARIABLE): >60 ML/MIN/1.73 M^2
GLUCOSE SERPL-MCNC: 92 MG/DL (ref 70–110)
HCT VFR BLD AUTO: 38.1 % (ref 40–54)
HGB BLD-MCNC: 13.4 G/DL (ref 14–18)
IMM GRANULOCYTES # BLD AUTO: 0.01 K/UL (ref 0–0.04)
LDH SERPL L TO P-CCNC: 209 U/L (ref 110–260)
MCH RBC QN AUTO: 32.2 PG (ref 27–31)
MCHC RBC AUTO-ENTMCNC: 35.2 G/DL (ref 32–36)
MCV RBC AUTO: 92 FL (ref 82–98)
NEUTROPHILS # BLD AUTO: 4.6 K/UL (ref 1.8–7.7)
PLATELET # BLD AUTO: 239 K/UL (ref 150–450)
PMV BLD AUTO: 10.5 FL (ref 9.2–12.9)
POTASSIUM SERPL-SCNC: 4.4 MMOL/L (ref 3.5–5.1)
PROT SERPL-MCNC: 6.5 G/DL (ref 6–8.4)
RBC # BLD AUTO: 4.16 M/UL (ref 4.6–6.2)
SODIUM SERPL-SCNC: 141 MMOL/L (ref 136–145)
WBC # BLD AUTO: 8 K/UL (ref 3.9–12.7)

## 2023-05-31 PROCEDURE — 85027 COMPLETE CBC AUTOMATED: CPT | Performed by: INTERNAL MEDICINE

## 2023-05-31 PROCEDURE — 99999 PR PBB SHADOW E&M-EST. PATIENT-LVL III: CPT | Mod: PBBFAC,,, | Performed by: INTERNAL MEDICINE

## 2023-05-31 PROCEDURE — 99999 PR PBB SHADOW E&M-EST. PATIENT-LVL III: ICD-10-PCS | Mod: PBBFAC,,, | Performed by: INTERNAL MEDICINE

## 2023-05-31 PROCEDURE — 99215 OFFICE O/P EST HI 40 MIN: CPT | Mod: S$PBB,,, | Performed by: INTERNAL MEDICINE

## 2023-05-31 PROCEDURE — 99215 PR OFFICE/OUTPT VISIT, EST, LEVL V, 40-54 MIN: ICD-10-PCS | Mod: S$PBB,,, | Performed by: INTERNAL MEDICINE

## 2023-05-31 PROCEDURE — 99213 OFFICE O/P EST LOW 20 MIN: CPT | Mod: PBBFAC | Performed by: INTERNAL MEDICINE

## 2023-05-31 PROCEDURE — 83615 LACTATE (LD) (LDH) ENZYME: CPT | Performed by: INTERNAL MEDICINE

## 2023-05-31 PROCEDURE — 80053 COMPREHEN METABOLIC PANEL: CPT | Performed by: INTERNAL MEDICINE

## 2023-05-31 PROCEDURE — 36415 COLL VENOUS BLD VENIPUNCTURE: CPT | Performed by: INTERNAL MEDICINE

## 2023-05-31 NOTE — TELEPHONE ENCOUNTER
----- Message from Lien Rodrigez sent at 5/31/2023  1:59 PM CDT -----  Regarding: Medical Assistance  Contact: Patient  Patient is requesting a call back from physician   Patient stated he just left office but had a question he forgot to ask   Please Assist     Patient can be reached at [phone]

## 2023-05-31 NOTE — TELEPHONE ENCOUNTER
Spoke with patient. Informed that [facility] not able to prescribe cannib[facility]. Outside facilities are able but often cannot utilize insurance coverage.

## 2023-05-31 NOTE — TELEPHONE ENCOUNTER
----- Message from Abundio Crowder sent at 5/31/2023  4:29 PM CDT -----  Type:  Patient Returning Call    Who Called:pt  Who Left Message for Patient:  Does the patient know what this is regarding?:pt advice  Would the patient rather a call back or a response via [facility]? call  Best Call Back Number:[phone]  Additional Information: pt has questions about getting an card for cannabis

## 2023-05-31 NOTE — PROGRESS NOTES
Hematology and Medical Oncology   Follow Up Note     05/31/2023    Primary Oncologic Diagnosis:DLBCL s/p ASCT in 2014    History of Present Ilness:   Tom Johnston (Tom) is a pleasant 38 y.o.male who presents for his annual visit.    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 It was treated with surgery followed by adriamycin/cyclophosphamide at [facility] in [place].   --In July 2012 he presented with vomiting and back pain. Its work-up included a CT scan that revealed multiple liver and spleen lesions as well as retrocrural, gelacio hepatis, gastrohepatic and peripancreatic lymphadenopathy. He was also found to have right axillary adenopathy which was biopsied on 7/12/12 demonstrating diffuse large B cell lymphoma. (Specimen# [id number]; [facility])   --Mr. Johnston reports that there were a controversy of whether his diagnosis being Hodgkins' lymphoma vs. Non-Hodgkins before the final announcement of diffuse large B cell lymphoma. Nor the pathology sample is available for review nor cytogenetics or molecular studies. LDH was 1055 and CMV was found positive at that time. HIV was negative.    --Subsequently he was started on R-EPOCH without adriamycin under the direction of Dr. Ambriz finishing 8th cycle on 3/1/13. After this, he underwent a CT scan which demonstrated mareked improvement of all the lesions except for a splenic mass of 3.3cm. This was surveillanced with a repeat CT scan in 11/2013 demonstrating enlargement of splenic masses and interval development of 30 hypodense areas in the liver. He was given 1 cycle of ritixumab/cyclophosphamide/oncovin/doxil/prednisone on 11/11/13 and referred to [facility]. Bone marrow biopsy did not reveal any sign of lymphoma. Liver mass was attempted to be IR-guided FNA biopsied, but it was inconclusive. He was admitted to the inpatient service and started first cycle of salvage RICE on 12/16/13. He  finished three more cycles of RICE 1/15/14, 2/27/14 and 4/7/14.   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 There were apparently small splenic lesions as well but no mention of size on that study. PET scan could not be obtained related to insurance prohibition.   --Gallium scan done 6/6/14 and 12/4/14 were negative. Hence, he appeared to be in a CR prior to transplant which continued at day 114.   --Bone marrow done 6/11/14 showed 50% cellularity with trilineage hematopoiesis and no evidence of lymphoma. Cytogenetics 46,XY    Interval History:   Mr. Johnston is doing well. He still works in a boiler room. He has no new issues or complaints. He denies all B symptoms. Does endorse some fatigue.    Watches the 9pm news each evening and then goes to bed. Wakes up at 5:30/6am usually, but still feels tired. Recently joined a gym.     Had a kidney stone on the left in April which took him to the emergency room.      Past Medical History:   Past Medical History:   Diagnosis Date    Bone marrow transplant status     Cancer     non-hogkins lymphoma    Chronic back pain     Reflux        Current Medications:   Current Outpatient Medications   Medication Sig    methocarbamoL (ROBAXIN) 500 MG Tab Take 2 tablets (1,000 mg total) by mouth 2 (two) times daily as needed (Pain). (Patient not taking: Reported on 5/31/2023)    naproxen (NAPROSYN) 500 MG tablet Take 1 tablet (500 mg total) by mouth 2 (two) times daily as needed (Pain). Make sure you take this medication with meals (Patient not taking: Reported on 5/31/2023)    pantoprazole (PROTONIX) 40 MG tablet Take 1 tablet (40 mg total) by mouth once daily. (Patient not taking: Reported on 5/31/2023)     No current  facility-administered medications for this visit.     ALLERGIES:   Review of patient's allergies indicates:  No Known Allergies    Review of Systems:     Review of Systems   Constitutional:  Positive for fatigue. Negative for appetite change, chills, diaphoresis, fever and unexpected weight change.   HENT:   Negative for hearing loss, mouth sores, nosebleeds, sore throat, trouble swallowing and voice change.    Eyes:  Negative for eye problems and icterus.   Respiratory:  Negative for chest tightness, cough, hemoptysis, shortness of breath and wheezing.    Cardiovascular:  Negative for chest pain, leg swelling and palpitations.   Gastrointestinal:  Negative for abdominal distention, abdominal pain, blood in stool, diarrhea, nausea and vomiting.   Endocrine: Negative for hot flashes.   Genitourinary:  Negative for bladder incontinence, difficulty urinating, dysuria and hematuria.    Musculoskeletal:  Negative for arthralgias, back pain, flank pain, gait problem, myalgias, neck pain and neck stiffness.   Skin:  Negative for itching, rash and wound.   Neurological:  Negative for dizziness, extremity weakness, gait problem, headaches, numbness, seizures and speech difficulty.   Hematological:  Negative for adenopathy. Does not bruise/bleed easily.   Psychiatric/Behavioral:  Negative for confusion, depression and sleep disturbance. The patient is not nervous/anxious.       Physical Exam:     Vitals:    05/31/23 1313   BP: 117/77   Pulse: 72   Resp: 16   Temp: 98.3 °F (36.8 °C)       Physical Exam  Constitutional:       General: He is not in acute distress.     Appearance: He is well-developed. He is not diaphoretic.   HENT:      Head: Normocephalic and atraumatic.      Mouth/Throat:      Pharynx: No oropharyngeal exudate.   Eyes:      Conjunctiva/sclera: Conjunctivae normal.      Pupils: Pupils are equal, round, and reactive to light.   Neck:      Thyroid: No thyromegaly.      Vascular: No JVD.      Trachea: No tracheal  deviation.   Cardiovascular:      Rate and Rhythm: Normal rate and regular rhythm.      Heart sounds: Normal heart sounds. No murmur heard.    No friction rub.   Pulmonary:      Effort: Pulmonary effort is normal. No respiratory distress.      Breath sounds: Normal breath sounds. No stridor. No wheezing or rales.   Chest:      Chest wall: No tenderness.   Abdominal:      General: Bowel sounds are normal. There is no distension.      Palpations: Abdomen is soft.      Tenderness: There is no abdominal tenderness. There is no guarding or rebound.   Musculoskeletal:         General: No tenderness or deformity. Normal range of motion.      Cervical back: Normal range of motion and neck supple.   Skin:     General: Skin is warm and dry.      Capillary Refill: Capillary refill takes less than 2 seconds.      Coloration: Skin is not pale.      Findings: No erythema or rash.   Neurological:      Mental Status: He is alert and oriented to person, place, and time.      Cranial Nerves: No cranial nerve deficit.      Sensory: No sensory deficit.      Motor: No abnormal muscle tone.      Coordination: Coordination normal.      Deep Tendon Reflexes: Reflexes normal.   Psychiatric:         Behavior: Behavior normal.         Thought Content: Thought content normal.         Judgment: Judgment normal.     ECOG Performance Status: (foot note - ECOG PS provided by Eastern Cooperative Oncology Group) 0 - Asymptomatic    Karnofsky Performance Score:  100%- Normal, No Complaints, No Evidence of Disease    Labs:   Lab Results   Component Value Date    WBC 8.00 05/31/2023    HGB 13.4 (L) 05/31/2023    HCT 38.1 (L) 05/31/2023     05/31/2023    ALT 33 05/31/2023    AST 33 05/31/2023     05/31/2023    K 4.4 05/31/2023     05/31/2023    CREATININE 0.9 05/31/2023    BUN 19 05/31/2023    CO2 24 05/31/2023    INR 1.0 08/05/2014     LDH:132    Imaging: Previous imaging has been personally reviewed     Assessment and Plan:       Preston is a 38 year old male who present post ASCT for lymphoma    DLBCL  --Doing well 8 years 9 months post ASCT  --All labs are within normal limits   --Denies all B symptoms  --Continue to follow with yearly physical exams and labs    Tobacco Use  --He has returned to smoking cigarettes  --The importance of all tobacco cessation was discussed   --Medication and counseling were both offered, he is not willing to discuss either  --Currently using marrijuana    Erythematous lesion  --Possible fungal vs scaly lesion  --Will refer to pcp for health maintenance       30 minutes were spent face to face with the patient to discuss the disease, natural history, treatment options and survival statistics. I have provided the patient with an opportunity to ask questions and have all questions answered to his satisfaction.       he will return to clinic in 12 months, but knows to call in the interim if symptoms change or should a problem arise.      Homa Gomez MD  Hematology and Medical Oncology  Bone Marrow Transplant  [facility]        BMT Chart Routing      Follow up with physician 1 year. 1. see me in 1 year with cbc,cmp, ldh   Follow up with WANDY    Provider visit type    Infusion scheduling note    Injection scheduling note    Labs    Imaging    Pharmacy appointment    Other referrals

## 2023-07-01 ENCOUNTER — HOSPITAL ENCOUNTER (EMERGENCY)
Facility: HOSPITAL | Age: 38
Discharge: HOME | End: 2023-07-01
Payer: MEDICARE

## 2023-07-01 VITALS
WEIGHT: 183 LBS | BODY MASS INDEX: 28.72 KG/M2 | OXYGEN SATURATION: 99 % | RESPIRATION RATE: 20 BRPM | DIASTOLIC BLOOD PRESSURE: 72 MMHG | SYSTOLIC BLOOD PRESSURE: 123 MMHG | HEART RATE: 75 BPM | TEMPERATURE: 98 F | HEIGHT: 67 IN

## 2023-07-01 DIAGNOSIS — S61.411A LACERATION OF RIGHT HAND WITHOUT FOREIGN BODY, INITIAL ENCOUNTER: Primary | ICD-10-CM

## 2023-07-01 DIAGNOSIS — S69.91XA INJURY OF RIGHT HAND, INITIAL ENCOUNTER: ICD-10-CM

## 2023-07-01 PROCEDURE — 90471 IMMUNIZATION ADMIN: CPT | Performed by: STUDENT IN AN ORGANIZED HEALTH CARE EDUCATION/TRAINING PROGRAM

## 2023-07-01 PROCEDURE — 12001 RPR S/N/AX/GEN/TRNK 2.5CM/<: CPT

## 2023-07-01 PROCEDURE — 90715 TDAP VACCINE 7 YRS/> IM: CPT | Performed by: STUDENT IN AN ORGANIZED HEALTH CARE EDUCATION/TRAINING PROGRAM

## 2023-07-01 PROCEDURE — 63600175 PHARM REV CODE 636 W HCPCS: Performed by: STUDENT IN AN ORGANIZED HEALTH CARE EDUCATION/TRAINING PROGRAM

## 2023-07-01 PROCEDURE — 25000003 PHARM REV CODE 250: Performed by: STUDENT IN AN ORGANIZED HEALTH CARE EDUCATION/TRAINING PROGRAM

## 2023-07-01 PROCEDURE — 99284 EMERGENCY DEPT VISIT MOD MDM: CPT | Mod: 25

## 2023-07-01 RX ORDER — LIDOCAINE HYDROCHLORIDE 10 MG/ML
10 INJECTION INFILTRATION; PERINEURAL
Status: COMPLETED | OUTPATIENT
Start: 2023-07-01 | End: 2023-07-01

## 2023-07-01 RX ADMIN — TETANUS TOXOID, REDUCED DIPHTHERIA TOXOID AND ACELLULAR PERTUSSIS VACCINE, ADSORBED 0.5 ML: 5; 2.5; 8; 8; 2.5 SUSPENSION INTRAMUSCULAR at 01:07

## 2023-07-01 RX ADMIN — LIDOCAINE HYDROCHLORIDE 10 ML: 10 INJECTION, SOLUTION INFILTRATION; PERINEURAL at 01:07

## 2023-07-01 NOTE — ED PROVIDER NOTES
Encounter Date: 2023       History     Chief Complaint   Patient presents with    Laceration     Palm of rt. Hand / cut with glass 1 hour ago     38-year-old male presents with right hand laceration.  Onset within the last couple hours, bleeding controlled prior to arrival.  Moderate severity not up-to-date with tetanus.  No associated focal motor or sensory deficit.  No other associated injury      Review of patient's allergies indicates:  No Known Allergies  Past Medical History:   Diagnosis Date    Bone marrow transplant status     Cancer     non-hogkins lymphoma    Chronic back pain     Reflux      Past Surgical History:   Procedure Laterality Date    LIMBAL STEM CELL TRANSPLANT  2015    lymph node biopsies       Family History   Problem Relation Age of Onset    Hypertension Mother     Hypertension Father     Colon cancer Paternal Grandfather      Social History     Tobacco Use    Smoking status: Former     Packs/day: 0.25     Years: 17.00     Pack years: 4.25     Types: Cigarettes     Start date: 2014     Quit date: 2014     Years since quittin.9    Smokeless tobacco: Current     Types: Snuff     Last attempt to quit: 3/21/2014   Substance Use Topics    Alcohol use: Yes     Alcohol/week: 0.0 - 0.8 standard drinks     Comment: beer every other weekend    Drug use: Yes     Frequency: 3.0 times per week     Types: Marijuana     Review of Systems   All other systems reviewed and are negative.    Physical Exam     Initial Vitals [23 1230]   BP Pulse Resp Temp SpO2   117/64 90 20 98 °F (36.7 °C) 97 %      MAP       --         Physical Exam    Nursing note and vitals reviewed.  Constitutional: Vital signs are normal. He appears well-developed and well-nourished.  Non-toxic appearance. No distress.   HENT:   Head: Normocephalic and atraumatic.   Eyes: EOM are normal. Right eye exhibits no discharge. Left eye exhibits no discharge.   Neck:   Normal range of motion.  Cardiovascular:  Normal rate  and regular rhythm.           Pulmonary/Chest: No stridor. No respiratory distress.   Bilateral chest rise   Abdominal: Abdomen is soft. There is no abdominal tenderness.   Musculoskeletal:         General: No tenderness or edema.      Cervical back: Normal range of motion. No rigidity.     Neurological: He is alert.   No associated focal motor or sensory deficit   Skin:   1 cm laceration right hand volar aspect no visible foreign body   Psychiatric: His speech is normal. He is not actively hallucinating.   Not anxious  or agitated       ED Course   Lac Repair    Date/Time: 7/1/2023 12:21 PM  Performed by: Cristobal Pacheco Jr., DO  Authorized by: Cristobal Pacheco Jr., DO     Consent:     Consent obtained:  Verbal    Consent given by:  Patient    Risks discussed:  Infection  Universal protocol:     Patient identity confirmed:  Verbally with patient  Anesthesia:     Anesthesia method:  Local infiltration    Local anesthetic:  Lidocaine 1% w/o epi  Laceration details:     Location:  Hand    Hand location:  R palm    Length (cm):  1  Pre-procedure details:     Preparation:  Patient was prepped and draped in usual sterile fashion  Exploration:     Limited defect created (wound extended): no      Imaging outcome: foreign body not noted    Treatment:     Area cleansed with:  Chlorhexidine and saline    Amount of cleaning:  Standard    Debridement:  None    Undermining:  None    Scar revision: no    Skin repair:     Repair method:  Sutures    Suture size:  5-0    Wound skin closure material used: ethilon.    Suture technique:  Simple interrupted    Number of sutures:  3  Approximation:     Approximation:  Close  Repair type:     Repair type:  Simple  Post-procedure details:     Dressing:  Sterile dressing    Procedure completion:  Tolerated  Labs Reviewed - No data to display       Imaging Results              X-Ray Hand 3 view Right (Final result)  Result time 07/01/23 13:54:12      Final result by Walter Vega MD  (07/01/23 13:54:12)                   Impression:      No acute radiographic findings of the right hand.      Electronically signed by: Walter Vega  Date:    07/01/2023  Time:    13:54               Narrative:    EXAMINATION:  XR HAND COMPLETE 3 VIEW RIGHT    CLINICAL HISTORY:  hand laceration;    TECHNIQUE:  PA, lateral, and oblique views of the right hand were performed.    COMPARISON:  05/16/2019.    FINDINGS:  No acute fracture or dislocation.  No significant soft tissue swelling.    The joint spaces are preserved.  Carpal bones normal in appearance.  Radiocarpal articulation is intact.  Ulnar styloid is normal.    No radiopaque foreign body.                                       Medications   Tdap (BOOSTRIX) vaccine injection 0.5 mL (0.5 mLs Intramuscular Given 7/1/23 1352)   LIDOcaine HCL 10 mg/ml (1%) injection 10 mL (10 mLs Infiltration Given 7/1/23 4161)     Medical Decision Making:   Initial Assessment:   38-year-old male presents with right hand laceration.  Bleeding controlled no neurovascular injury  Differential Diagnosis:   Likely simple laceration no obvious foreign body obtained with no acute fracture or dislocation.  No neurovascular injury on exam no superimposed infection  ED Management:  Patient administered tetanus laceration closed via primary mechanism after being washed out.  Patient tolerated procedure well will be discharged with wound care instructions follow up in 7-10 days for wound re-evaluation and return sooner for any worsening signs of infection or other concerning symptoms.  Patient voiced understanding and agrees with plan                        Clinical Impression:   Final diagnoses:  [S61.411A] Laceration of right hand without foreign body, initial encounter (Primary)  [S69.91XA] Injury of right hand, initial encounter        ED Disposition Condition    Discharge Stable          ED Prescriptions    None       Follow-up Information       Follow up With Specialties Details Why  Contact Info Additional Information    [facility] ED Emergency Medicine In 10 days As needed, If symptoms worsen [address] [place] [address] [address] [place] [id number] 1st floor             Cristobal Pacheco Jr., DO  07/01/23 6041

## 2023-07-01 NOTE — DISCHARGE INSTRUCTIONS
Return in 7-10 days for suture removal.  Return sooner for any worsening symptoms or signs of infection.

## 2024-04-19 ENCOUNTER — TELEPHONE (OUTPATIENT)
Dept: HEMATOLOGY/ONCOLOGY | Facility: CLINIC | Age: 39
End: 2024-04-19
Payer: MEDICARE

## 2024-04-19 NOTE — TELEPHONE ENCOUNTER
Called and spoke to Annie.  Pt requested to move his appt to June.  Appt rescheduled to June 3rd at 2pm.  All questions and concerns addressed.

## 2024-07-11 ENCOUNTER — LAB VISIT (OUTPATIENT)
Dept: LAB | Facility: HOSPITAL | Age: 39
End: 2024-07-11

## 2024-07-11 DIAGNOSIS — C83.30: ICD-10-CM

## 2024-07-11 LAB
ALBUMIN SERPL BCP-MCNC: 4 G/DL (ref 3.5–5.2)
ALP SERPL-CCNC: 88 U/L (ref 55–135)
ALT SERPL W/O P-5'-P-CCNC: 21 U/L (ref 10–44)
ANION GAP SERPL CALC-SCNC: 4 MMOL/L (ref 8–16)
AST SERPL-CCNC: 17 U/L (ref 10–40)
BILIRUB SERPL-MCNC: 0.7 MG/DL (ref 0.1–1)
BUN SERPL-MCNC: 18 MG/DL (ref 6–20)
CALCIUM SERPL-MCNC: 9 MG/DL (ref 8.7–10.5)
CHLORIDE SERPL-SCNC: 108 MMOL/L (ref 95–110)
CO2 SERPL-SCNC: 26 MMOL/L (ref 23–29)
CREAT SERPL-MCNC: 0.9 MG/DL (ref 0.5–1.4)
ERYTHROCYTE [DISTWIDTH] IN BLOOD BY AUTOMATED COUNT: 12.2 % (ref 11.5–14.5)
EST. GFR  (NO RACE VARIABLE): >60 ML/MIN/1.73 M^2
GLUCOSE SERPL-MCNC: 96 MG/DL (ref 70–110)
HCT VFR BLD AUTO: 38.5 % (ref 40–54)
HGB BLD-MCNC: 13.7 G/DL (ref 14–18)
IMM GRANULOCYTES # BLD AUTO: 0.03 K/UL (ref 0–0.04)
LDH SERPL L TO P-CCNC: 164 U/L (ref 110–260)
MCH RBC QN AUTO: 33 PG (ref 27–31)
MCHC RBC AUTO-ENTMCNC: 35.6 G/DL (ref 32–36)
MCV RBC AUTO: 93 FL (ref 82–98)
NEUTROPHILS # BLD AUTO: 6.9 K/UL (ref 1.8–7.7)
PLATELET # BLD AUTO: 208 K/UL (ref 150–450)
PMV BLD AUTO: 10.6 FL (ref 9.2–12.9)
POTASSIUM SERPL-SCNC: 4 MMOL/L (ref 3.5–5.1)
PROT SERPL-MCNC: 6.3 G/DL (ref 6–8.4)
RBC # BLD AUTO: 4.15 M/UL (ref 4.6–6.2)
SODIUM SERPL-SCNC: 138 MMOL/L (ref 136–145)
WBC # BLD AUTO: 10.05 K/UL (ref 3.9–12.7)

## 2024-07-11 PROCEDURE — 80053 COMPREHEN METABOLIC PANEL: CPT | Performed by: INTERNAL MEDICINE

## 2024-07-11 PROCEDURE — 83615 LACTATE (LD) (LDH) ENZYME: CPT | Performed by: INTERNAL MEDICINE

## 2024-07-11 PROCEDURE — 36415 COLL VENOUS BLD VENIPUNCTURE: CPT | Performed by: INTERNAL MEDICINE

## 2024-07-11 PROCEDURE — 85027 COMPLETE CBC AUTOMATED: CPT | Performed by: INTERNAL MEDICINE

## 2024-07-17 ENCOUNTER — TELEPHONE (OUTPATIENT)
Dept: HEMATOLOGY/ONCOLOGY | Facility: CLINIC | Age: 39
End: 2024-07-17

## 2024-07-17 NOTE — TELEPHONE ENCOUNTER
Pt's spouse requested a call going over labs. Informed pt labs are stable and at baseline per Cecille CANTRELL. Pt's spouse verbalized understanding and has no further questions.

## 2024-07-21 ENCOUNTER — HOSPITAL ENCOUNTER (EMERGENCY)
Facility: HOSPITAL | Age: 39
Discharge: LEFT BEFORE DISCHARGE | End: 2024-07-21

## 2024-07-21 ENCOUNTER — HOSPITAL ENCOUNTER (EMERGENCY)
Facility: HOSPITAL | Age: 39
Discharge: LEFT BEFORE DISCHARGE | End: 2024-07-21
Attending: EMERGENCY MEDICINE

## 2024-07-21 VITALS
RESPIRATION RATE: 18 BRPM | DIASTOLIC BLOOD PRESSURE: 60 MMHG | HEART RATE: 90 BPM | SYSTOLIC BLOOD PRESSURE: 118 MMHG | HEIGHT: 66 IN | WEIGHT: 175 LBS | TEMPERATURE: 98 F | OXYGEN SATURATION: 100 % | BODY MASS INDEX: 28.12 KG/M2

## 2024-07-21 VITALS
RESPIRATION RATE: 18 BRPM | DIASTOLIC BLOOD PRESSURE: 65 MMHG | HEIGHT: 66 IN | SYSTOLIC BLOOD PRESSURE: 109 MMHG | OXYGEN SATURATION: 96 % | BODY MASS INDEX: 28.12 KG/M2 | WEIGHT: 175 LBS | HEART RATE: 84 BPM | TEMPERATURE: 98 F

## 2024-07-21 DIAGNOSIS — B07.9 WART OF HAND: Primary | ICD-10-CM

## 2024-07-21 PROCEDURE — 99900041 HC LEFT WITHOUT BEING SEEN- EMERGENCY

## 2024-07-21 PROCEDURE — 99281 EMR DPT VST MAYX REQ PHY/QHP: CPT

## 2024-07-21 RX ORDER — KETOROLAC TROMETHAMINE 30 MG/ML
15 INJECTION, SOLUTION INTRAMUSCULAR; INTRAVENOUS
Status: DISCONTINUED | OUTPATIENT
Start: 2024-07-21 | End: 2024-07-21 | Stop reason: HOSPADM

## 2024-07-22 NOTE — ED NOTES
Pt left after being seen by the MD without telling anyone. All bathrooms checked, hallways checked, waiting area checked - no sign of pt being in ED

## 2024-07-22 NOTE — ED PROVIDER NOTES
Encounter Date: 2024       History     Chief Complaint   Patient presents with    Hand Pain     Pt has wart on R hand that is painful     HPI    39-year-old male with past medical history of non-Hodgkin's lymphoma presents to the emergency department with complaint of a wart towards his right hand that he states has been present for years though acutely worsened this week.  States that he attempted to use a freeze wart remover and it caused his pain to worsened.  He reports a shooting sensation location of his wart down his elbow.  Denies any associated fever, chills, nausea, vomiting, drainage from the site.  Denies any recent trauma.  Review of patient's allergies indicates:  No Known Allergies  Past Medical History:   Diagnosis Date    Bone marrow transplant status     Cancer     non-hogkins lymphoma    Chronic back pain     Reflux      Past Surgical History:   Procedure Laterality Date    LIMBAL STEM CELL TRANSPLANT  2015    lymph node biopsies       Family History   Problem Relation Name Age of Onset    Hypertension Mother      Hypertension Father      Colon cancer Paternal Grandfather       Social History     Tobacco Use    Smoking status: Former     Current packs/day: 0.00     Average packs/day: 0.3 packs/day for 17.0 years (4.3 ttl pk-yrs)     Types: Cigarettes     Start date: 2014     Quit date: 2014     Years since quittin.9    Smokeless tobacco: Current     Types: Snuff     Last attempt to quit: 3/21/2014   Substance Use Topics    Alcohol use: Yes     Alcohol/week: 0.0 - 0.8 standard drinks of alcohol     Comment: beer every other weekend    Drug use: Yes     Frequency: 3.0 times per week     Types: Marijuana     Review of Systems   Constitutional:  Negative for fever.   HENT:  Negative for sore throat.    Respiratory:  Negative for shortness of breath.    Cardiovascular:  Negative for chest pain.   Gastrointestinal:  Negative for nausea.   Genitourinary:  Negative for dysuria.    Musculoskeletal:  Negative for back pain.        Right hand pain   Skin:  Negative for rash.   Neurological:  Negative for weakness.   Hematological:  Does not bruise/bleed easily.       Physical Exam     Initial Vitals [07/21/24 2015]   BP Pulse Resp Temp SpO2   109/65 84 18 98.2 °F (36.8 °C) 96 %      MAP       --         Physical Exam    Vitals reviewed.  Constitutional: He appears well-developed and well-nourished.  Non-toxic appearance.   HENT:   Head: Normocephalic and atraumatic.   Eyes: EOM are normal. Pupils are equal, round, and reactive to light.   Neck: Neck supple.   Normal range of motion.  Cardiovascular:  Normal rate and regular rhythm.           Musculoskeletal:      Cervical back: Normal range of motion and neck supple.     Neurological: He is alert and oriented to person, place, and time.   Skin: Skin is warm and dry.   Towards patient's right hand 3 plantar warts were noted on the palmar aspect with significant tenderness to palpation to most lateral plantar wart          ED Course   Procedures  Labs Reviewed - No data to display       Imaging Results    None          Medications - No data to display  Medical Decision Making  Risk  Prescription drug management.    39-year-old male with past medical history of non-Hodgkin's lymphoma presents to the emergency department with complaint of a wart towards his right hand that he states has been present for years though acutely worsened this week.  States that he attempted to use a freeze wart remover and it caused his pain to worsened.  He reports a shooting sensation location of his wart down his elbow.  Denies any associated fever, chills, nausea, vomiting, drainage from the site.  Denies any recent trauma.  Initial vital signs within normal limits.  Physical examination as stated above.  Discussed with patient he likely will need a dermatology referral on plan to treat pain though prior to ability to receive treatment patient eloped from the  emergency department.  Case was discussed with Dr. Meneses.    Reji Morales MD  PGY-4 [facility] Emergency Medicine  5:55 AM 7/22/2024                                    Clinical Impression:  Final diagnoses:  [B07.9] Wart of hand (Primary)          ED Disposition Condition    [facility]                 Reji Morales MD  Resident  07/22/24 0523

## 2024-08-04 ENCOUNTER — HOSPITAL ENCOUNTER (EMERGENCY)
Facility: HOSPITAL | Age: 39
Discharge: HOME | End: 2024-08-04
Attending: EMERGENCY MEDICINE

## 2024-08-04 VITALS
SYSTOLIC BLOOD PRESSURE: 132 MMHG | OXYGEN SATURATION: 98 % | DIASTOLIC BLOOD PRESSURE: 84 MMHG | RESPIRATION RATE: 16 BRPM | TEMPERATURE: 98 F | BODY MASS INDEX: 28.25 KG/M2 | HEART RATE: 84 BPM | WEIGHT: 175 LBS

## 2024-08-04 DIAGNOSIS — L02.91 ABSCESS: Primary | ICD-10-CM

## 2024-08-04 PROCEDURE — 25000003 PHARM REV CODE 250: Performed by: EMERGENCY MEDICINE

## 2024-08-04 PROCEDURE — 99284 EMERGENCY DEPT VISIT MOD MDM: CPT | Mod: 25

## 2024-08-04 PROCEDURE — 10060 I&D ABSCESS SIMPLE/SINGLE: CPT

## 2024-08-04 RX ORDER — MUPIROCIN 20 MG/G
OINTMENT TOPICAL 3 TIMES DAILY
Qty: 30 G | Refills: 0 | Status: SHIPPED | OUTPATIENT
Start: 2024-08-04

## 2024-08-04 RX ORDER — HYDROCODONE BITARTRATE AND ACETAMINOPHEN 10; 325 MG/1; MG/1
1 TABLET ORAL EVERY 6 HOURS PRN
Qty: 12 TABLET | Refills: 0 | Status: SHIPPED | OUTPATIENT
Start: 2024-08-04 | End: 2024-08-07

## 2024-08-04 RX ORDER — DOXYCYCLINE 100 MG/1
100 CAPSULE ORAL 2 TIMES DAILY
Qty: 20 CAPSULE | Refills: 0 | Status: SHIPPED | OUTPATIENT
Start: 2024-08-04 | End: 2024-08-14

## 2024-08-04 RX ORDER — LIDOCAINE HYDROCHLORIDE 10 MG/ML
5 INJECTION, SOLUTION EPIDURAL; INFILTRATION; INTRACAUDAL; PERINEURAL
Status: COMPLETED | OUTPATIENT
Start: 2024-08-04 | End: 2024-08-04

## 2024-08-04 RX ORDER — MUPIROCIN 20 MG/G
OINTMENT TOPICAL
Status: COMPLETED | OUTPATIENT
Start: 2024-08-04 | End: 2024-08-04

## 2024-08-04 RX ADMIN — MUPIROCIN: 20 OINTMENT TOPICAL at 09:08

## 2024-08-04 RX ADMIN — LIDOCAINE HYDROCHLORIDE 50 MG: 10 INJECTION, SOLUTION EPIDURAL; INFILTRATION; INTRACAUDAL; PERINEURAL at 09:08

## 2024-08-05 NOTE — ED PROVIDER NOTES
Encounter Date: 8/4/2024       History     Chief Complaint   Patient presents with    Abscess     Patient c/o abscess to upper back x 3 days. Denies fever/chills     The patient is here for a draining abscess to the upper back for 3 days.  Cystic like anterior came out of it per the wife.  The infection is spreading slightly according to the wife in his here for evaluation.  No complaints otherwise.  He is a survivor of non-Hodgkin's lymphoma.        Review of patient's allergies indicates:  No Known Allergies  Past Medical History:   Diagnosis Date    Bone marrow transplant status     Cancer     non-hogkins lymphoma    Chronic back pain     Reflux      Past Surgical History:   Procedure Laterality Date    LIMBAL STEM CELL TRANSPLANT  2015    lymph node biopsies       Family History   Problem Relation Name Age of Onset    Hypertension Mother      Hypertension Father      Colon cancer Paternal Grandfather       Social History     Tobacco Use    Smoking status: Former     Current packs/day: 0.00     Average packs/day: 0.3 packs/day for 17.0 years (4.3 ttl pk-yrs)     Types: Cigarettes     Start date: 2/20/2014     Quit date: 7/27/2014     Years since quitting: 10.0    Smokeless tobacco: Current     Types: Snuff     Last attempt to quit: 3/21/2014   Substance Use Topics    Alcohol use: Yes     Alcohol/week: 0.0 - 0.8 standard drinks of alcohol     Comment: beer every other weekend    Drug use: Yes     Frequency: 3.0 times per week     Types: Marijuana     Review of Systems   Constitutional:  Negative for chills and fever.   HENT:  Negative for ear pain, rhinorrhea and sore throat.    Eyes:  Negative for pain and visual disturbance.   Respiratory:  Negative for cough and shortness of breath.    Cardiovascular:  Negative for chest pain and palpitations.   Gastrointestinal:  Negative for abdominal pain, constipation, diarrhea, nausea and vomiting.   Genitourinary:  Negative for dysuria, frequency, hematuria and urgency.    Musculoskeletal:  Negative for back pain, joint swelling and myalgias.   Skin:  Negative for rash.        Abscess   Neurological:  Negative for dizziness, seizures, weakness and headaches.   Psychiatric/Behavioral:  Negative for dysphoric mood. The patient is not nervous/anxious.        Physical Exam     Initial Vitals   BP Pulse Resp Temp SpO2   08/04/24 1908 08/04/24 1908 08/04/24 1908 08/04/24 1907 08/04/24 1908   124/80 100 18 98.2 °F (36.8 °C) 97 %      MAP       --                Physical Exam    Nursing note and vitals reviewed.  Constitutional: He appears well-developed and well-nourished.   HENT:   Head: Normocephalic and atraumatic.   Eyes: Conjunctivae, EOM and lids are normal. Pupils are equal, round, and reactive to light.   Neck: Trachea normal. Neck supple. No thyroid mass present.   Cardiovascular:  Normal rate, regular rhythm and normal heart sounds.           Pulmonary/Chest: Breath sounds normal. No respiratory distress.   Abdominal: Abdomen is soft. There is no abdominal tenderness.   Musculoskeletal:         General: Normal range of motion.      Cervical back: Neck supple.     Neurological: He is alert and oriented to person, place, and time. He has normal strength and normal reflexes. No cranial nerve deficit or sensory deficit.   Skin: Skin is warm and dry.   Patient with on inch by 1/2 inch area of redness and fluctuance.  No drainage at the present time.  Location upper mid back.   Psychiatric: He has a normal mood and affect. His speech is normal and behavior is normal. Judgment and thought content normal.         ED Course   I & D - Incision and Drainage    Date/Time: 8/4/2024 9:43 PM  Location procedure was performed: [facility] EMERGENCY DEPARTMENT    Performed by: Wei Meneses MD  Authorized by: Wei Meneses MD  Comments: The patient has a small abscess to the upper back.  Patient skin cleaned with dilute Betadine.  Wound anesthetized with 5 cc of lidocaine 1% without as a  local.  With an 11 blade a quarter-inch incision was made and white material came from that as well as blood.  Wound open all quadrants wound irrigated and then packed with gauze.  Patient tolerated the procedure well Bactroban to be applied nonadherent dressing.        Labs Reviewed - No data to display       Imaging Results    None          Medications   LIDOcaine (PF) 10 mg/ml (1%) injection 50 mg (50 mg Infiltration Given 8/4/24 2130)   mupirocin 2 % ointment ( Topical (Top) Given 8/4/24 2130)     Medical Decision Making  Patient with small abscess to the back I&D by me patient be discharged.Wei Meneses MD  12:20 AM 08/06/2024          Risk  Prescription drug management.                                      Clinical Impression:  Final diagnoses:  [L02.91] Abscess (Primary)          ED Disposition Condition    Discharge Stable          ED Prescriptions       Medication Sig Dispense Start Date End Date Auth. Provider    doxycycline (VIBRAMYCIN) 100 MG Cap Take 1 capsule (100 mg total) by mouth 2 (two) times daily. for 10 days 20 capsule 8/4/2024 8/14/2024 Wei Meneses MD    HYDROcodone-acetaminophen (NORCO)  mg per tablet Take 1 tablet by mouth every 6 (six) hours as needed for Pain. 12 tablet 8/4/2024 8/7/2024 Wei Meneses MD    mupirocin (BACTROBAN) 2 % ointment Apply topically 3 (three) times daily. 30 g 8/4/2024 -- Wei Meneses MD          Follow-up Information    None          Wei Meneses MD  08/04/24 2106       Wei Meneses MD  08/06/24 0020

## 2024-08-05 NOTE — DISCHARGE INSTRUCTIONS
Remove packing 2 days at your doctor's office.  Return for spread of the redness spread of swelling return for severe pain fever chills nausea vomiting weakness.

## 2024-08-28 ENCOUNTER — LAB VISIT (OUTPATIENT)
Dept: LAB | Facility: HOSPITAL | Age: 39
End: 2024-08-28

## 2024-08-28 DIAGNOSIS — C83.30: ICD-10-CM

## 2024-08-28 LAB
ALBUMIN SERPL BCP-MCNC: 4.4 G/DL (ref 3.5–5.2)
ALP SERPL-CCNC: 85 U/L (ref 55–135)
ALT SERPL W/O P-5'-P-CCNC: 31 U/L (ref 10–44)
ANION GAP SERPL CALC-SCNC: 8 MMOL/L (ref 8–16)
AST SERPL-CCNC: 25 U/L (ref 10–40)
BILIRUB SERPL-MCNC: 0.6 MG/DL (ref 0.1–1)
BUN SERPL-MCNC: 17 MG/DL (ref 6–20)
CALCIUM SERPL-MCNC: 10.1 MG/DL (ref 8.7–10.5)
CHLORIDE SERPL-SCNC: 105 MMOL/L (ref 95–110)
CO2 SERPL-SCNC: 26 MMOL/L (ref 23–29)
CREAT SERPL-MCNC: 0.9 MG/DL (ref 0.5–1.4)
ERYTHROCYTE [DISTWIDTH] IN BLOOD BY AUTOMATED COUNT: 12.9 % (ref 11.5–14.5)
EST. GFR  (NO RACE VARIABLE): >60 ML/MIN/1.73 M^2
GLUCOSE SERPL-MCNC: 103 MG/DL (ref 70–110)
HCT VFR BLD AUTO: 40.8 % (ref 40–54)
HGB BLD-MCNC: 14.7 G/DL (ref 14–18)
IMM GRANULOCYTES # BLD AUTO: 0.02 K/UL (ref 0–0.04)
LDH SERPL L TO P-CCNC: 170 U/L (ref 110–260)
MCH RBC QN AUTO: 33.6 PG (ref 27–31)
MCHC RBC AUTO-ENTMCNC: 36 G/DL (ref 32–36)
MCV RBC AUTO: 93 FL (ref 82–98)
NEUTROPHILS # BLD AUTO: 4.6 K/UL (ref 1.8–7.7)
PLATELET # BLD AUTO: 229 K/UL (ref 150–450)
PMV BLD AUTO: 10.4 FL (ref 9.2–12.9)
POTASSIUM SERPL-SCNC: 4.4 MMOL/L (ref 3.5–5.1)
PROT SERPL-MCNC: 7 G/DL (ref 6–8.4)
RBC # BLD AUTO: 4.38 M/UL (ref 4.6–6.2)
SODIUM SERPL-SCNC: 139 MMOL/L (ref 136–145)
WBC # BLD AUTO: 7.99 K/UL (ref 3.9–12.7)

## 2024-08-28 PROCEDURE — 80053 COMPREHEN METABOLIC PANEL: CPT | Performed by: INTERNAL MEDICINE

## 2024-08-28 PROCEDURE — 83615 LACTATE (LD) (LDH) ENZYME: CPT | Performed by: INTERNAL MEDICINE

## 2024-08-28 PROCEDURE — 85027 COMPLETE CBC AUTOMATED: CPT | Performed by: INTERNAL MEDICINE

## 2024-08-28 PROCEDURE — 36415 COLL VENOUS BLD VENIPUNCTURE: CPT | Performed by: INTERNAL MEDICINE

## 2024-08-30 ENCOUNTER — TELEPHONE (OUTPATIENT)
Dept: HEMATOLOGY/ONCOLOGY | Facility: CLINIC | Age: 39
End: 2024-08-30

## 2024-08-30 ENCOUNTER — OFFICE VISIT (OUTPATIENT)
Dept: HEMATOLOGY/ONCOLOGY | Facility: CLINIC | Age: 39
End: 2024-08-30

## 2024-08-30 VITALS
TEMPERATURE: 98 F | DIASTOLIC BLOOD PRESSURE: 72 MMHG | HEART RATE: 73 BPM | RESPIRATION RATE: 18 BRPM | SYSTOLIC BLOOD PRESSURE: 123 MMHG | BODY MASS INDEX: 29.62 KG/M2 | OXYGEN SATURATION: 99 % | HEIGHT: 66 IN | WEIGHT: 184.31 LBS

## 2024-08-30 DIAGNOSIS — C83.32 DIFFUSE LARGE B-CELL LYMPHOMA OF INTRATHORACIC LYMPH NODES: Primary | ICD-10-CM

## 2024-08-30 DIAGNOSIS — G89.3 CANCER RELATED PAIN: ICD-10-CM

## 2024-08-30 DIAGNOSIS — Z94.84 HISTORY OF PERIPHERAL STEM CELL TRANSPLANT: ICD-10-CM

## 2024-08-30 PROCEDURE — 99999 PR PBB SHADOW E&M-EST. PATIENT-LVL III: CPT | Mod: PBBFAC,,, | Performed by: INTERNAL MEDICINE

## 2024-08-30 PROCEDURE — 99215 OFFICE O/P EST HI 40 MIN: CPT | Mod: S$PBB,,, | Performed by: INTERNAL MEDICINE

## 2024-08-30 PROCEDURE — 99213 OFFICE O/P EST LOW 20 MIN: CPT | Mod: PBBFAC | Performed by: INTERNAL MEDICINE

## 2024-08-30 NOTE — TELEPHONE ENCOUNTER
Spoke w/ pt in regards to scheduling psych referral per Dr. Gomez. Pt approved to schedule in-person appt with Dr. Bismark Daley  for 9/26 @ 2:30PM.  MA advised that clinic is located on the 2nd floor of the [facility].       [place], [place] ext [phone]

## 2024-08-30 NOTE — PROGRESS NOTES
Hematology and Medical Oncology   Follow Up Note     08/30/2024    Primary Oncologic Diagnosis:DLBCL s/p ASCT in 2014    History of Present Ilness:   Tom Johnston (Tom) is a pleasant 39 y.o.male who presents for his annual visit.    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 It was treated with surgery followed by adriamycin/cyclophosphamide at [facility] in [place].   --In July 2012 he presented with vomiting and back pain. Its work-up included a CT scan that revealed multiple liver and spleen lesions as well as retrocrural, gelacio hepatis, gastrohepatic and peripancreatic lymphadenopathy. He was also found to have right axillary adenopathy which was biopsied on 7/12/12 demonstrating diffuse large B cell lymphoma. (Specimen# [id number]; [facility])   --Mr. Johnston reports that there were a controversy of whether his diagnosis being Hodgkins' lymphoma vs. Non-Hodgkins before the final announcement of diffuse large B cell lymphoma. Nor the pathology sample is available for review nor cytogenetics or molecular studies. LDH was 1055 and CMV was found positive at that time. HIV was negative.    --Subsequently he was started on R-EPOCH without adriamycin under the direction of Dr. Ambriz finishing 8th cycle on 3/1/13. After this, he underwent a CT scan which demonstrated mareked improvement of all the lesions except for a splenic mass of 3.3cm. This was surveillanced with a repeat CT scan in 11/2013 demonstrating enlargement of splenic masses and interval development of 30 hypodense areas in the liver. He was given 1 cycle of ritixumab/cyclophosphamide/oncovin/doxil/prednisone on 11/11/13 and referred to [facility]. Bone marrow biopsy did not reveal any sign of lymphoma. Liver mass was attempted to be IR-guided FNA biopsied, but it was inconclusive. He was admitted to the inpatient service and started first cycle of salvage RICE on 12/16/13. He  finished three more cycles of RICE 1/15/14, 2/27/14 and 4/7/14.   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 There were apparently small splenic lesions as well but no mention of size on that study. PET scan could not be obtained related to insurance prohibition.   --Gallium scan done 6/6/14 and 12/4/14 were negative. Hence, he appeared to be in a CR prior to transplant which continued at day 114.   --Bone marrow done 6/11/14 showed 50% cellularity with trilineage hematopoiesis and no evidence of lymphoma. Cytogenetics 46,XY    Interval History:   Mr. Johnston is doing well. He still works in a boiler room. He has no new issues or complaints. He denies all B symptoms. Does endorse some fatigue.    Numerous ED visits over the last year. Most recently he had an abscess on his back that required draining.     Increased panic/anxiety, particularly when he eats CBD gummies.      Past Medical History:   Past Medical History:   Diagnosis Date    Bone marrow transplant status     Cancer     non-hogkins lymphoma    Chronic back pain     Reflux        Current Medications:   Current Outpatient Medications   Medication Sig    amoxicillin-clavulanate 875-125mg (AUGMENTIN) 875-125 mg per tablet Take 1 tablet by mouth 2 (two) times daily. (Patient not taking: Reported on 8/30/2024)    mupirocin (BACTROBAN) 2 % ointment Apply topically 3 (three) times daily. (Patient not taking: Reported on 8/30/2024)     No current facility-administered medications for this visit.     ALLERGIES:   Review of patient's allergies indicates:  No Known Allergies    Review of Systems:     Review of Systems   Constitutional:  Positive for fatigue. Negative for appetite change, chills, diaphoresis,  fever and unexpected weight change.   HENT:   Negative for hearing loss, mouth sores, nosebleeds, sore throat, trouble swallowing and voice change.    Eyes:  Negative for eye problems and icterus.   Respiratory:  Negative for chest tightness, cough, hemoptysis, shortness of breath and wheezing.    Cardiovascular:  Negative for chest pain, leg swelling and palpitations.   Gastrointestinal:  Negative for abdominal distention, abdominal pain, blood in stool, diarrhea, nausea and vomiting.   Endocrine: Negative for hot flashes.   Genitourinary:  Negative for bladder incontinence, difficulty urinating, dysuria and hematuria.    Musculoskeletal:  Negative for arthralgias, back pain, flank pain, gait problem, myalgias, neck pain and neck stiffness.   Skin:  Negative for itching, rash and wound.   Neurological:  Negative for dizziness, extremity weakness, gait problem, headaches, numbness, seizures and speech difficulty.   Hematological:  Negative for adenopathy. Does not bruise/bleed easily.   Psychiatric/Behavioral:  Positive for decreased concentration and sleep disturbance. Negative for confusion and depression. The patient is nervous/anxious.         Physical Exam:     Vitals:    08/30/24 0733   BP: 123/72   Pulse: 73   Resp: 18   Temp: 97.6 °F (36.4 °C)       Physical Exam  Constitutional:       General: He is not in acute distress.     Appearance: He is well-developed. He is not diaphoretic.   HENT:      Head: Normocephalic and atraumatic.      Mouth/Throat:      Pharynx: No oropharyngeal exudate.   Eyes:      Conjunctiva/sclera: Conjunctivae normal.      Pupils: Pupils are equal, round, and reactive to light.   Neck:      Thyroid: No thyromegaly.      Vascular: No JVD.      Trachea: No tracheal deviation.   Cardiovascular:      Rate and Rhythm: Normal rate and regular rhythm.      Heart sounds: Normal heart sounds. No murmur heard.     No friction rub.   Pulmonary:      Effort: Pulmonary effort is normal. No  respiratory distress.      Breath sounds: Normal breath sounds. No stridor. No wheezing or rales.   Chest:      Chest wall: No tenderness.   Abdominal:      General: Bowel sounds are normal. There is no distension.      Palpations: Abdomen is soft.      Tenderness: There is no abdominal tenderness. There is no guarding or rebound.   Musculoskeletal:         General: No tenderness or deformity. Normal range of motion.      Cervical back: Normal range of motion and neck supple.   Skin:     General: Skin is warm and dry.      Capillary Refill: Capillary refill takes less than 2 seconds.      Coloration: Skin is not pale.      Findings: No erythema or rash.   Neurological:      Mental Status: He is alert and oriented to person, place, and time.      Cranial Nerves: No cranial nerve deficit.      Sensory: No sensory deficit.      Motor: No abnormal muscle tone.      Coordination: Coordination normal.      Deep Tendon Reflexes: Reflexes normal.   Psychiatric:         Behavior: Behavior normal.         Thought Content: Thought content normal.         Judgment: Judgment normal.       ECOG Performance Status: (foot note - ECOG PS provided by Eastern Cooperative Oncology Group) 0 - Asymptomatic    Karnofsky Performance Score:  100%- Normal, No Complaints, No Evidence of Disease    Labs:   Lab Results   Component Value Date    WBC 7.99 08/28/2024    HGB 14.7 08/28/2024    HCT 40.8 08/28/2024     08/28/2024    ALT 31 08/28/2024    AST 25 08/28/2024     08/28/2024    K 4.4 08/28/2024     08/28/2024    CREATININE 0.9 08/28/2024    BUN 17 08/28/2024    CO2 26 08/28/2024    INR 1.0 08/05/2014     LDH:170    Imaging: Previous imaging has been personally reviewed     Assessment and Plan:     Mr. Johnston is a 39 year old male who present post ASCT for lymphoma    DLBCL  --Doing well 10 years post ASCT  --All labs are within normal limits   --Denies all B symptoms  --Continue to follow with yearly physical exams and  labs    Tobacco Use  --He has returned to smoking cigarettes  --The importance of all tobacco cessation was discussed   --Medication and counseling were both offered, he is not willing to discuss either  --Currently using marrijuana    Cancer Related Anxiety  --Referral to psych onc for coping exercise teaching  --Pt is inquiring about a medical marijuana card      30 minutes were spent face to face with the patient to discuss the disease, natural history, treatment options and survival statistics. I have provided the patient with an opportunity to ask questions and have all questions answered to his satisfaction.       he will return to clinic in 12 months, but knows to call in the interim if symptoms change or should a problem arise.      Homa Gomez MD  Hematology and Medical Oncology  Bone Marrow Transplant  [facility]        BMT Chart Routing      Follow up with physician . 1. see me in 12 months with a cbc,cmp, ldh   Follow up with W[facility]    Provider visit type    Infusion scheduling note    Injection scheduling note    Labs    Imaging    Pharmacy appointment    Other referrals

## 2024-09-26 ENCOUNTER — TELEPHONE (OUTPATIENT)
Dept: HEMATOLOGY/ONCOLOGY | Facility: CLINIC | Age: 39
End: 2024-09-26
Payer: MEDICARE

## 2024-09-26 NOTE — TELEPHONE ENCOUNTER
Pt requested to r/s appt with Dr. Daley due to emergency call for work. Pt apologized for having to cancel and appt r/s to 10/7 @ 8AM, in-person. MA offer to send out appt letter reminder in the mail. Pt decline and stated he get text messages from [facility]. MA acknowledged.      [place], [place] ext [phone]

## 2024-10-04 ENCOUNTER — TELEPHONE (OUTPATIENT)
Dept: HEMATOLOGY/ONCOLOGY | Facility: CLINIC | Age: 39
End: 2024-10-04
Payer: MEDICARE

## 2024-10-04 NOTE — TELEPHONE ENCOUNTER
Spoke w/ pt in regards to confirming in person appt with Dr. Daley for Monday 10/7 @ 8AM. Pt confirmed.     [place], [place] ext [phone]

## 2024-10-07 ENCOUNTER — TELEPHONE (OUTPATIENT)
Dept: HEMATOLOGY/ONCOLOGY | Facility: CLINIC | Age: 39
End: 2024-10-07
Payer: MEDICARE

## 2024-10-07 NOTE — TELEPHONE ENCOUNTER
Spoke w/ pt in regards to his 8am appt with Dr. Daley. Pt stated pt is stuck in traffic and won't make it in time. MA asked how far out was the pt. Pt's response was that pt originally had something to talk about but has gotten over it. MA offer to r/s. Pt decline. MA offer call back number should pt changes mind.       [place], [place] ext [phone]

## 2024-11-06 ENCOUNTER — HOSPITAL ENCOUNTER (EMERGENCY)
Facility: HOSPITAL | Age: 39
Discharge: LEFT BEFORE DISCHARGE | End: 2024-11-06
Attending: STUDENT IN AN ORGANIZED HEALTH CARE EDUCATION/TRAINING PROGRAM

## 2024-11-06 VITALS
OXYGEN SATURATION: 98 % | HEART RATE: 93 BPM | HEIGHT: 66 IN | DIASTOLIC BLOOD PRESSURE: 79 MMHG | TEMPERATURE: 98 F | WEIGHT: 180 LBS | RESPIRATION RATE: 18 BRPM | SYSTOLIC BLOOD PRESSURE: 119 MMHG | BODY MASS INDEX: 28.93 KG/M2

## 2024-11-06 DIAGNOSIS — R05.9 COUGH: ICD-10-CM

## 2024-11-06 DIAGNOSIS — Z53.21 ELOPED FROM EMERGENCY DEPARTMENT: Primary | ICD-10-CM

## 2024-11-06 LAB
GROUP A STREP, MOLECULAR: NEGATIVE
INFLUENZA A, MOLECULAR: NEGATIVE
INFLUENZA B, MOLECULAR: NEGATIVE
SARS-COV-2 RDRP RESP QL NAA+PROBE: NEGATIVE
SPECIMEN SOURCE: NORMAL

## 2024-11-06 PROCEDURE — 87635 SARS-COV-2 COVID-19 AMP PRB: CPT | Performed by: EMERGENCY MEDICINE

## 2024-11-06 PROCEDURE — 87651 STREP A DNA AMP PROBE: CPT | Performed by: EMERGENCY MEDICINE

## 2024-11-06 PROCEDURE — 87502 INFLUENZA DNA AMP PROBE: CPT | Performed by: EMERGENCY MEDICINE

## 2024-11-06 PROCEDURE — 99282 EMERGENCY DEPT VISIT SF MDM: CPT

## 2024-11-07 NOTE — ED PROVIDER NOTES
"Encounter Date: 11/6/2024       History     Chief Complaint   Patient presents with    Cough     Productive cough, body aches, neck pain, chills, sweating, vomiting since Tuesday.      39-year-old male with a past medical history of non-Hodgkin's lymphoma, neuroblastoma at age 5 presents emergency department with complaint of body aches, nasal congestion, nonproductive cough states "I feel like I have the flu".  Patient states his symptoms started yesterday, he denies any chest pain shortness of breath or pleuritic pain, he has had no nausea vomiting or abdominal pain.  Denies any known ill contacts    The history is provided by the patient.     Review of patient's allergies indicates:  No Known Allergies  Past Medical History:   Diagnosis Date    Bone marrow transplant status     Cancer     non-hogkins lymphoma    Chronic back pain     Reflux      Past Surgical History:   Procedure Laterality Date    LIMBAL STEM CELL TRANSPLANT  2015    lymph node biopsies       Family History   Problem Relation Name Age of Onset    Hypertension Mother      Hypertension Father      Colon cancer Paternal Grandfather       Social History     Tobacco Use    Smoking status: Former     Current packs/day: 0.00     Average packs/day: 0.3 packs/day for 17.0 years (4.3 ttl pk-yrs)     Types: Cigarettes     Start date: 2/20/2014     Quit date: 7/27/2014     Years since quitting: 10.2    Smokeless tobacco: Current     Types: Snuff     Last attempt to quit: 3/21/2014   Substance Use Topics    Alcohol use: Yes     Alcohol/week: 0.0 - 0.8 standard drinks of alcohol     Comment: beer every other weekend    Drug use: Yes     Frequency: 3.0 times per week     Types: Marijuana     Review of Systems   Constitutional:  Positive for chills.   HENT:  Positive for congestion, rhinorrhea and sore throat.    Respiratory:  Positive for cough.    Cardiovascular: Negative.    Gastrointestinal: Negative.  Negative for abdominal pain.   Genitourinary: Negative. " "   Musculoskeletal:  Positive for myalgias.   Neurological: Negative.    Hematological: Negative.    Psychiatric/Behavioral: Negative.     All other systems reviewed and are negative.      Physical Exam     Initial Vitals [11/06/24 1750]   BP Pulse Resp Temp SpO2   119/79 93 18 98 °F (36.7 °C) 98 %      MAP       --         Physical Exam    Constitutional: He appears well-developed and well-nourished.   HENT:   Head: Normocephalic and atraumatic.   Right Ear: External ear normal.   Left Ear: External ear normal.   Nose: Nose normal. Mouth/Throat: No oropharyngeal exudate.   Eyes: Conjunctivae and EOM are normal. Pupils are equal, round, and reactive to light.   Neck: Neck supple.   Normal range of motion.  Cardiovascular:  Normal rate, regular rhythm, normal heart sounds and intact distal pulses.           Pulmonary/Chest: Breath sounds normal. No respiratory distress. He exhibits no tenderness.   Abdominal: Abdomen is soft. Bowel sounds are normal. He exhibits no distension. There is no abdominal tenderness.   Musculoskeletal:      Cervical back: Normal range of motion and neck supple.     Neurological: He is alert and oriented to person, place, and time. He has normal strength.   Skin: Skin is warm. No rash noted.         ED Course   Procedures  Labs Reviewed   GROUP A STREP, MOLECULAR       Result Value    Group A Strep, Molecular Negative     SARS-COV-2 RNA AMPLIFICATION, QUAL    SARS-CoV-2 RNA, Amplification, Qual Negative     INFLUENZA A AND B ANTIGEN    Influenza A, Molecular Negative      Influenza B, Molecular Negative      Flu A & B Source Nasal swab      Narrative:     Specimen Source->Nasopharyngeal Swab          Imaging Results    None          Medications - No data to display  Medical Decision Making  39-year-old male with a past medical history of non-Hodgkin's lymphoma, neuroblastoma at age 5 presents emergency department with complaint of body aches, nasal congestion, nonproductive cough states "I " "feel like I have the flu".  Patient states his symptoms started yesterday, he denies any chest pain shortness of breath or pleuritic pain, he has had no nausea vomiting or abdominal pain.  Denies any known ill contacts    The history is provided by the patient.     Considerations include but not limited to, pneumonia, COVID, influenza, strep, embolus related to lymphoma    39-year-old male with a past medical history non-Hodgkin's lymphoma presents emergency department with complaint of body aches nasal congestion nonproductive cough stating that he felt like he had the flu.  Denies any known ill contacts.  Patient denies any chest pain or shortness of breath he did have a COVID flu and strep test that was negative I did order a chest x-ray on the patient however the patient eloped the emergency department prior to receiving a chest x-ray or any further ER workup                                      Clinical Impression:  Final diagnoses:  [R05.9] Cough  [Z53.21] Eloped from emergency department (Primary)          ED Disposition Condition    Eloped                 Flavia Madrigal FNP  11/06/24 2313    "

## 2025-03-26 ENCOUNTER — TELEPHONE (OUTPATIENT)
Dept: HEMATOLOGY/ONCOLOGY | Facility: CLINIC | Age: 40
End: 2025-03-26

## 2025-03-26 NOTE — TELEPHONE ENCOUNTER
----- Message from Mallika sent at 3/26/2025 12:16 PM CDT -----  Regarding: Scheduling Request  Contact: Tom  Scheduling Request   Appt Type:  labs F/U Date/Time Preference: First Availabl.  Treating Provider:Jason Caller Name:Tom Norman Johnston Contact Preference:[phone] (home) , requesting a call back.  Comments/notes: Pt states he would like to get in as soon as he can, states he has not been feeling like himself, things seem to be a bit off.

## 2025-03-28 ENCOUNTER — LAB VISIT (OUTPATIENT)
Dept: LAB | Facility: HOSPITAL | Age: 40
End: 2025-03-28
Attending: INTERNAL MEDICINE

## 2025-03-28 DIAGNOSIS — C83.32 DIFFUSE LARGE B-CELL LYMPHOMA OF INTRATHORACIC LYMPH NODES: ICD-10-CM

## 2025-03-28 LAB
ABSOLUTE EOSINOPHIL (OHS): 0.16 K/UL
ABSOLUTE MONOCYTE (OHS): 0.66 K/UL (ref 0.3–1)
ABSOLUTE NEUTROPHIL COUNT (OHS): 4.01 K/UL (ref 1.8–7.7)
ALBUMIN SERPL BCP-MCNC: 4.1 G/DL (ref 3.5–5.2)
ALP SERPL-CCNC: 86 UNIT/L (ref 40–150)
ALT SERPL W/O P-5'-P-CCNC: 23 UNIT/L (ref 10–44)
ANION GAP (OHS): 6 MMOL/L (ref 8–16)
AST SERPL-CCNC: 21 UNIT/L (ref 11–45)
BASOPHILS # BLD AUTO: 0.04 K/UL
BASOPHILS NFR BLD AUTO: 0.6 %
BILIRUB SERPL-MCNC: 0.7 MG/DL (ref 0.1–1)
BUN SERPL-MCNC: 19 MG/DL (ref 6–20)
CALCIUM SERPL-MCNC: 9.4 MG/DL (ref 8.7–10.5)
CHLORIDE SERPL-SCNC: 109 MMOL/L (ref 95–110)
CO2 SERPL-SCNC: 26 MMOL/L (ref 23–29)
CREAT SERPL-MCNC: 0.8 MG/DL (ref 0.5–1.4)
ERYTHROCYTE [DISTWIDTH] IN BLOOD BY AUTOMATED COUNT: 12.3 % (ref 11.5–14.5)
GFR SERPLBLD CREATININE-BSD FMLA CKD-EPI: >60 ML/MIN/1.73/M2
GLUCOSE SERPL-MCNC: 107 MG/DL (ref 70–110)
HCT VFR BLD AUTO: 36.3 % (ref 40–54)
HGB BLD-MCNC: 12.7 GM/DL (ref 14–18)
IMM GRANULOCYTES # BLD AUTO: 0.02 K/UL (ref 0–0.04)
IMM GRANULOCYTES NFR BLD AUTO: 0.3 % (ref 0–0.5)
LDH SERPL-CCNC: 188 U/L (ref 110–260)
LYMPHOCYTES # BLD AUTO: 1.75 K/UL (ref 1–4.8)
MCH RBC QN AUTO: 32.2 PG (ref 27–50)
MCHC RBC AUTO-ENTMCNC: 35 G/DL (ref 32–36)
MCV RBC AUTO: 92 FL (ref 82–98)
NUCLEATED RBC (/100WBC) (OHS): 0 /100 WBC
PLATELET # BLD AUTO: 228 K/UL (ref 150–450)
PMV BLD AUTO: 10.6 FL (ref 9.2–12.9)
POTASSIUM SERPL-SCNC: 4.6 MMOL/L (ref 3.5–5.1)
PROT SERPL-MCNC: 6.6 GM/DL (ref 6–8.4)
RBC # BLD AUTO: 3.95 M/UL (ref 4.6–6.2)
RELATIVE EOSINOPHIL (OHS): 2.4 %
RELATIVE LYMPHOCYTE (OHS): 26.4 % (ref 18–48)
RELATIVE MONOCYTE (OHS): 9.9 % (ref 4–15)
RELATIVE NEUTROPHIL (OHS): 60.4 % (ref 38–73)
SODIUM SERPL-SCNC: 141 MMOL/L (ref 136–145)
WBC # BLD AUTO: 6.64 K/UL (ref 3.9–12.7)

## 2025-03-28 PROCEDURE — 82040 ASSAY OF SERUM ALBUMIN: CPT

## 2025-03-28 PROCEDURE — 85025 COMPLETE CBC W/AUTO DIFF WBC: CPT

## 2025-03-28 PROCEDURE — 83615 LACTATE (LD) (LDH) ENZYME: CPT

## 2025-03-28 PROCEDURE — 36415 COLL VENOUS BLD VENIPUNCTURE: CPT

## 2025-07-29 ENCOUNTER — LAB VISIT (OUTPATIENT)
Dept: LAB | Facility: HOSPITAL | Age: 40
End: 2025-07-29

## 2025-07-29 DIAGNOSIS — C83.38 DIFFUSE LARGE B-CELL LYMPHOMA OF LYMPH NODES OF MULTIPLE REGIONS: ICD-10-CM

## 2025-07-29 LAB
ABSOLUTE EOSINOPHIL (OHS): 0.27 K/UL
ABSOLUTE MONOCYTE (OHS): 0.94 K/UL (ref 0.3–1)
ABSOLUTE NEUTROPHIL COUNT (OHS): 7.66 K/UL (ref 1.8–7.7)
ALBUMIN SERPL BCP-MCNC: 4.6 G/DL (ref 3.5–5.2)
ALP SERPL-CCNC: 94 UNIT/L (ref 40–150)
ALT SERPL W/O P-5'-P-CCNC: 33 UNIT/L (ref 0–55)
ANION GAP (OHS): 7 MMOL/L (ref 8–16)
AST SERPL-CCNC: 23 UNIT/L (ref 0–50)
BASOPHILS # BLD AUTO: 0.04 K/UL
BASOPHILS NFR BLD AUTO: 0.4 %
BILIRUB SERPL-MCNC: 0.5 MG/DL (ref 0.1–1)
BUN SERPL-MCNC: 17 MG/DL (ref 6–20)
CALCIUM SERPL-MCNC: 9.5 MG/DL (ref 8.7–10.5)
CHLORIDE SERPL-SCNC: 109 MMOL/L (ref 95–110)
CO2 SERPL-SCNC: 25 MMOL/L (ref 23–29)
CREAT SERPL-MCNC: 0.9 MG/DL (ref 0.5–1.4)
ERYTHROCYTE [DISTWIDTH] IN BLOOD BY AUTOMATED COUNT: 12.6 % (ref 11.5–14.5)
GFR SERPLBLD CREATININE-BSD FMLA CKD-EPI: >60 ML/MIN/1.73/M2
GLUCOSE SERPL-MCNC: 101 MG/DL (ref 70–110)
HCT VFR BLD AUTO: 36 % (ref 40–54)
HGB BLD-MCNC: 13.2 GM/DL (ref 14–18)
IMM GRANULOCYTES # BLD AUTO: 0.03 K/UL (ref 0–0.04)
IMM GRANULOCYTES NFR BLD AUTO: 0.3 % (ref 0–0.5)
LDH SERPL-CCNC: 182 U/L (ref 110–260)
LYMPHOCYTES # BLD AUTO: 1.95 K/UL (ref 1–4.8)
MCH RBC QN AUTO: 32.8 PG (ref 27–31)
MCHC RBC AUTO-ENTMCNC: 36.7 G/DL (ref 32–36)
MCV RBC AUTO: 90 FL (ref 82–98)
NUCLEATED RBC (/100WBC) (OHS): 0 /100 WBC
PLATELET # BLD AUTO: 222 K/UL (ref 150–450)
PMV BLD AUTO: 10.5 FL (ref 9.2–12.9)
POTASSIUM SERPL-SCNC: 3.9 MMOL/L (ref 3.5–5.1)
PROT SERPL-MCNC: 6.5 GM/DL (ref 6–8.4)
RBC # BLD AUTO: 4.02 M/UL (ref 4.6–6.2)
RELATIVE EOSINOPHIL (OHS): 2.5 %
RELATIVE LYMPHOCYTE (OHS): 17.9 % (ref 18–48)
RELATIVE MONOCYTE (OHS): 8.6 % (ref 4–15)
RELATIVE NEUTROPHIL (OHS): 70.3 % (ref 38–73)
SODIUM SERPL-SCNC: 141 MMOL/L (ref 136–145)
WBC # BLD AUTO: 10.89 K/UL (ref 3.9–12.7)

## 2025-07-29 PROCEDURE — 85025 COMPLETE CBC W/AUTO DIFF WBC: CPT

## 2025-07-29 PROCEDURE — 83615 LACTATE (LD) (LDH) ENZYME: CPT

## 2025-07-29 PROCEDURE — 82040 ASSAY OF SERUM ALBUMIN: CPT

## 2025-07-29 PROCEDURE — 36415 COLL VENOUS BLD VENIPUNCTURE: CPT

## 2025-08-01 ENCOUNTER — OFFICE VISIT (OUTPATIENT)
Dept: HEMATOLOGY/ONCOLOGY | Facility: CLINIC | Age: 40
End: 2025-08-01

## 2025-08-01 ENCOUNTER — TELEPHONE (OUTPATIENT)
Dept: HEMATOLOGY/ONCOLOGY | Facility: CLINIC | Age: 40
End: 2025-08-01

## 2025-08-01 VITALS — HEIGHT: 66 IN | BODY MASS INDEX: 29.25 KG/M2 | WEIGHT: 182 LBS

## 2025-08-01 DIAGNOSIS — C83.31 DIFFUSE LARGE B-CELL LYMPHOMA OF LYMPH NODES OF NECK: Primary | ICD-10-CM

## 2025-08-01 DIAGNOSIS — Z94.84 HISTORY OF PERIPHERAL STEM CELL TRANSPLANT: ICD-10-CM

## 2025-08-01 DIAGNOSIS — C85.80 LARGE CELL LYMPHOMA: ICD-10-CM

## 2025-08-01 DIAGNOSIS — R11.14 BILIOUS VOMITING WITH NAUSEA: ICD-10-CM

## 2025-08-01 PROCEDURE — 99215 OFFICE O/P EST HI 40 MIN: CPT | Mod: 95,,, | Performed by: INTERNAL MEDICINE

## 2025-08-01 PROCEDURE — G2211 COMPLEX E/M VISIT ADD ON: HCPCS | Mod: 95,,, | Performed by: INTERNAL MEDICINE

## 2025-08-01 NOTE — TELEPHONE ENCOUNTER
Copied from CRM #[id number]. Topic: Appointments - Appointment Access  >> Aug 1, 2025  2:02 PM Jennifer wrote:  Type: Patient call    Who called: Patient     Does the patient know what this is regarding? Requesting a call back in regards to needing to reschedule appt on today ; states he just needs to discuss blood work ; willing to do a virtual appt if possible; please advise     Would the patient rather a call back or response via [facility]? Call    Best call back number: [phone]    Additional information:

## 2025-08-01 NOTE — TELEPHONE ENCOUNTER
Copied from CRM #[id number]. Topic: Appointments - Appointment Rescheduling  >> Aug 1, 2025 10:17 AM Balbir wrote:  Reschedule Existing Appointment     Current appt date:8/1     Type of appt : 12 months with a cbc,cmp, ldh     Physician: Dr. Gomez     Reason for rescheduling: Trying to either come in earlier or do the appt as a virtual     Caller: Annie     Contact Preference: [phone]

## 2025-08-01 NOTE — PROGRESS NOTES
Hematology and Medical Oncology   Follow Up Note     08/01/2025    Primary Oncologic Diagnosis:DLBCL s/p ASCT in 2014    Telemedicine Documentation:  The patient location is: [facility]  The chief complaint leading to consultation is: DLBCL in remission    Visit type: audiovisual    Face to Face time with patient: 25  40 minutes of total time spent on the encounter, which includes face to face time and non-face to face time preparing to see the patient (eg, review of tests), Obtaining and/or reviewing separately obtained history, Documenting clinical information in the electronic or other health record, Independently interpreting results (not separately reported) and communicating results to the patient/family/caregiver, or Care coordination (not separately reported).         Each patient to whom he or she provides medical services by telemedicine is:  (1) informed of the relationship between the physician and patient and the respective role of any other health care provider with respect to management of the patient; and (2) notified that he or she may decline to receive medical services by telemedicine and may withdraw from such care at any time.    History of Present Ilness:   Tom Arrington) is a pleasant 40 y.o.male who presents for his annual visit.    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 It was treated with surgery followed by adriamycin/cyclophosphamide at [facility] in [place].   --In July 2012 he presented with vomiting and back pain. Its work-up included a CT scan that revealed multiple liver and spleen lesions as well as retrocrural, gelacio hepatis, gastrohepatic and peripancreatic lymphadenopathy. He was also found to have right axillary adenopathy which was biopsied on 7/12/12 demonstrating diffuse large B cell lymphoma. (Specimen# [id number]; [facility])   --Mr. Johnston reports that there were a controversy of whether his  diagnosis being Hodgkins' lymphoma vs. Non-Hodgkins before the final announcement of diffuse large B cell lymphoma. Nor the pathology sample is available for review nor cytogenetics or molecular studies. LDH was 1055 and CMV was found positive at that time. HIV was negative.    --Subsequently he was started on R-EPOCH without adriamycin under the direction of Dr. Ambriz finishing 8th cycle on 3/1/13. After this, he underwent a CT scan which demonstrated mareked improvement of all the lesions except for a splenic mass of 3.3cm. This was surveillanced with a repeat CT scan in 11/2013 demonstrating enlargement of splenic masses and interval development of 30 hypodense areas in the liver. He was given 1 cycle of ritixumab/cyclophosphamide/oncovin/doxil/prednisone on 11/11/13 and referred to [facility]. Bone marrow biopsy did not reveal any sign of lymphoma. Liver mass was attempted to be IR-guided FNA biopsied, but it was inconclusive. He was admitted to the inpatient service and started first cycle of salvage RICE on 12/16/13. He finished three more cycles of RICE 1/15/14, 2/27/14 and 4/7/14.   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 There were apparently small splenic lesions as well but no mention of size on that study. PET scan could not be obtained related to insurance prohibition.   --Gallium scan done 6/6/14 and 12/4/14 were negative. Hence, he appeared to be in a CR prior to transplant which continued at day 114.   --Bone marrow done 6/11/14 showed 50% cellularity with trilineage hematopoiesis and no evidence of lymphoma. Cytogenetics 46,XY    Interval History:   Mr. Johnston is doing well. He still works in a boiler room. He has no new issues or complaints.  He denies all B symptoms. Does endorse some fatigue.    Overall doing well. Continues to work full time. No signs/ symptoms of relapse.      Past Medical History:   Past Medical History:   Diagnosis Date    Bone marrow transplant status     Cancer     non-hogkins lymphoma    Chronic back pain     Reflux        Current Medications:   Current Outpatient Medications   Medication Sig    amoxicillin-clavulanate 875-125mg (AUGMENTIN) 875-125 mg per tablet Take 1 tablet by mouth 2 (two) times daily. (Patient not taking: Reported on 8/30/2024)    mupirocin (BACTROBAN) 2 % ointment Apply topically 3 (three) times daily. (Patient not taking: Reported on 8/30/2024)     No current facility-administered medications for this visit.     ALLERGIES:   Review of patient's allergies indicates:  No Known Allergies    Review of Systems:     Review of Systems   Constitutional:  Positive for fatigue. Negative for appetite change, chills, diaphoresis, fever and unexpected weight change.   HENT:   Negative for hearing loss, mouth sores, nosebleeds, sore throat, trouble swallowing and voice change.    Eyes:  Negative for eye problems and icterus.   Respiratory:  Negative for chest tightness, cough, hemoptysis, shortness of breath and wheezing.    Cardiovascular:  Negative for chest pain, leg swelling and palpitations.   Gastrointestinal:  Negative for abdominal distention, abdominal pain, blood in stool, diarrhea, nausea and vomiting.   Endocrine: Negative for hot flashes.   Genitourinary:  Negative for bladder incontinence, difficulty urinating, dysuria and hematuria.    Musculoskeletal:  Negative for arthralgias, back pain, flank pain, gait problem, myalgias, neck pain and neck stiffness.   Skin:  Negative for itching, rash and wound.   Neurological:  Negative for dizziness, extremity weakness, gait problem, headaches, numbness, seizures and speech difficulty.   Hematological:  Negative for adenopathy. Does not bruise/bleed easily.    Psychiatric/Behavioral:  Positive for decreased concentration and sleep disturbance. Negative for confusion and depression. The patient is nervous/anxious.         Physical Exam:   Limited secondary to virtual visit    ECOG Performance Status: (foot note - ECOG PS provided by Eastern Cooperative Oncology Group) 0 - Asymptomatic    Karnofsky Performance Score:  100%- Normal, No Complaints, No Evidence of Disease    Labs:   Lab Results   Component Value Date    WBC 10.89 07/29/2025    HGB 13.2 (L) 07/29/2025    HCT 36.0 (L) 07/29/2025     07/29/2025    ALT 33 07/29/2025    AST 23 07/29/2025     07/29/2025    K 3.9 07/29/2025     07/29/2025    CREATININE 0.9 07/29/2025    BUN 17 07/29/2025    CO2 25 07/29/2025    INR 1.0 08/05/2014     LDH:170 --> 182    Imaging: Previous imaging has been personally reviewed     Assessment and Plan:     Mr. Johnston is a 40 year old male who present post ASCT for lymphoma    DLBCL  --Doing well 10 years post ASCT  --All labs are within normal limits   --Denies all B symptoms  --Continue to follow with yearly physical exams and labs    Tobacco Use  --He has returned to smoking cigarettes  --The importance of all tobacco cessation was discussed   --Medication and counseling were both offered, he is not willing to discuss either  --Currently using marrijuana    Cancer Related Anxiety  --Referral to psych onc for coping exercise teaching  --Pt is inquiring about a medical marijuana card    I have provided the patient with an opportunity to ask questions and have all questions answered to his satisfaction.       he will return to clinic in 12 months, but knows to call in the interim if symptoms change or should a problem arise.      Homa Gomez MD  Hematology and Medical Oncology  Bone Marrow Transplant  [facility] [[email or web address]]        BMT Chart Routing      Follow up with physician 1 year. 1. labs [cbc,mp,ldh] and see me in 1 year okay for appt to be virtual   Follow  up with [facility]    Provider visit type    Infusion scheduling note    Injection scheduling note    Labs    Imaging    Pharmacy appointment    Other referrals

## 2025-08-01 NOTE — TELEPHONE ENCOUNTER
Pt stated he was unable to come in due to having a lot to do for work and asked if it could be virtual. Changed appt to virtual per pt request.